# Patient Record
Sex: FEMALE | Race: WHITE | Employment: OTHER | URBAN - METROPOLITAN AREA
[De-identification: names, ages, dates, MRNs, and addresses within clinical notes are randomized per-mention and may not be internally consistent; named-entity substitution may affect disease eponyms.]

---

## 2018-04-25 ENCOUNTER — HOSPITAL ENCOUNTER (OUTPATIENT)
Age: 53
Discharge: HOME | End: 2018-04-27
Payer: COMMERCIAL

## 2018-04-25 ENCOUNTER — HOSPITAL ENCOUNTER (OUTPATIENT)
Dept: GENERAL RADIOLOGY | Age: 53
Discharge: HOME | End: 2018-04-27
Payer: COMMERCIAL

## 2018-04-25 DIAGNOSIS — R52 PAIN: ICD-10-CM

## 2018-04-25 PROCEDURE — 73521 X-RAY EXAM HIPS BI 2 VIEWS: CPT

## 2018-04-25 PROCEDURE — 73552 X-RAY EXAM OF FEMUR 2/>: CPT

## 2018-04-25 PROCEDURE — 73630 X-RAY EXAM OF FOOT: CPT

## 2018-10-31 ENCOUNTER — TELEPHONE (OUTPATIENT)
Dept: ORTHOPEDIC SURGERY | Age: 53
End: 2018-10-31

## 2018-11-01 RX ORDER — IBUPROFEN 800 MG/1
800 TABLET ORAL EVERY 8 HOURS PRN
Qty: 90 TABLET | Refills: 3 | Status: SHIPPED | OUTPATIENT
Start: 2018-11-01 | End: 2019-09-05 | Stop reason: ALTCHOICE

## 2019-09-05 ENCOUNTER — HOSPITAL ENCOUNTER (EMERGENCY)
Age: 54
Discharge: HOME | End: 2019-09-05
Attending: FAMILY MEDICINE
Payer: COMMERCIAL

## 2019-09-05 VITALS
DIASTOLIC BLOOD PRESSURE: 82 MMHG | HEIGHT: 62 IN | WEIGHT: 200 LBS | TEMPERATURE: 97.9 F | RESPIRATION RATE: 14 BRPM | OXYGEN SATURATION: 97 % | SYSTOLIC BLOOD PRESSURE: 140 MMHG | HEART RATE: 88 BPM | BODY MASS INDEX: 36.8 KG/M2

## 2019-09-05 DIAGNOSIS — M54.50 ACUTE EXACERBATION OF CHRONIC LOW BACK PAIN: Primary | ICD-10-CM

## 2019-09-05 DIAGNOSIS — G89.29 ACUTE EXACERBATION OF CHRONIC LOW BACK PAIN: Primary | ICD-10-CM

## 2019-09-05 PROCEDURE — 6370000000 HC RX 637 (ALT 250 FOR IP): Performed by: FAMILY MEDICINE

## 2019-09-05 PROCEDURE — 96372 THER/PROPH/DIAG INJ SC/IM: CPT

## 2019-09-05 PROCEDURE — 99283 EMERGENCY DEPT VISIT LOW MDM: CPT

## 2019-09-05 PROCEDURE — 6360000002 HC RX W HCPCS: Performed by: FAMILY MEDICINE

## 2019-09-05 RX ORDER — ACETAMINOPHEN 500 MG
1000 TABLET ORAL ONCE
Status: COMPLETED | OUTPATIENT
Start: 2019-09-05 | End: 2019-09-05

## 2019-09-05 RX ORDER — CYCLOBENZAPRINE HCL 10 MG
10 TABLET ORAL ONCE
Status: COMPLETED | OUTPATIENT
Start: 2019-09-05 | End: 2019-09-05

## 2019-09-05 RX ORDER — KETOROLAC TROMETHAMINE 30 MG/ML
30 INJECTION, SOLUTION INTRAMUSCULAR; INTRAVENOUS ONCE
Status: COMPLETED | OUTPATIENT
Start: 2019-09-05 | End: 2019-09-05

## 2019-09-05 RX ORDER — ACETAMINOPHEN 500 MG
1000 TABLET ORAL EVERY 8 HOURS PRN
Qty: 50 TABLET | Refills: 0 | Status: SHIPPED | OUTPATIENT
Start: 2019-09-05

## 2019-09-05 RX ORDER — CYCLOBENZAPRINE HCL 5 MG
5 TABLET ORAL 3 TIMES DAILY PRN
Qty: 12 TABLET | Refills: 0 | Status: SHIPPED | OUTPATIENT
Start: 2019-09-05 | End: 2019-09-15

## 2019-09-05 RX ORDER — IBUPROFEN 400 MG/1
400 TABLET ORAL EVERY 8 HOURS PRN
Qty: 12 TABLET | Refills: 0 | Status: SHIPPED | OUTPATIENT
Start: 2019-09-05

## 2019-09-05 RX ADMIN — ACETAMINOPHEN 1000 MG: 500 TABLET ORAL at 10:29

## 2019-09-05 RX ADMIN — KETOROLAC TROMETHAMINE 30 MG: 30 INJECTION, SOLUTION INTRAMUSCULAR at 10:29

## 2019-09-05 RX ADMIN — CYCLOBENZAPRINE HYDROCHLORIDE 10 MG: 10 TABLET, FILM COATED ORAL at 10:29

## 2019-09-05 ASSESSMENT — PAIN DESCRIPTION - ORIENTATION
ORIENTATION: RIGHT
ORIENTATION: RIGHT
ORIENTATION: RIGHT;LOWER

## 2019-09-05 ASSESSMENT — PAIN SCALES - GENERAL
PAINLEVEL_OUTOF10: 10
PAINLEVEL_OUTOF10: 8
PAINLEVEL_OUTOF10: 10
PAINLEVEL_OUTOF10: 6

## 2019-09-05 ASSESSMENT — PAIN DESCRIPTION - ONSET: ONSET: GRADUAL

## 2019-09-05 ASSESSMENT — PAIN DESCRIPTION - PROGRESSION
CLINICAL_PROGRESSION: GRADUALLY IMPROVING
CLINICAL_PROGRESSION: GRADUALLY WORSENING
CLINICAL_PROGRESSION: GRADUALLY IMPROVING

## 2019-09-05 ASSESSMENT — PAIN DESCRIPTION - LOCATION
LOCATION: BACK

## 2019-09-05 ASSESSMENT — PAIN DESCRIPTION - PAIN TYPE: TYPE: ACUTE PAIN

## 2019-09-05 ASSESSMENT — PAIN DESCRIPTION - FREQUENCY: FREQUENCY: CONTINUOUS

## 2019-09-05 NOTE — ED NOTES
Pt states her pain is much less. Pt not driving, pt's daughter is here and will be driving.      Elijah Oh RN  09/05/19 [id number]

## 2019-09-05 NOTE — ED PROVIDER NOTES
[bacitracin-neomycin-polymyxin]    Physical Exam           ED Triage Vitals   BP Temp Temp Source Pulse Resp SpO2 Height Weight   09/05/19 0928 09/05/19 0928 09/05/19 0928 09/05/19 0928 09/05/19 0928 09/05/19 0928 09/05/19 0950 09/05/19 0950   (!) 140/82 97.9 °F (36.6 °C) Oral 86 16 97 % 5' 2\" (1.575 m) 200 lb (90.7 kg)      Oxygen Saturation Interpretation: Normal.    Constitutional:  Alert, development consistent with age. HEENT:  NC/NT. Airway patent. Neck:  Normal ROM. Supple. Respiratory:  Clear to auscultation and breath sounds equal.  CV:  Regular rate and rhythm, normal heart sounds, without pathological murmurs, ectopy, gallops, or rubs. GI:  Abdomen Soft, nontender, good bowel sounds. No firm or pulsatile mass. Back: lower lumbar spine right sided. Tenderness: Mild. Swelling: no.              Range of Motion: diminished range with pain. CVA Tenderness: No.            Straight leg raising:  Right positive at 30 degrees. Skin:  no erythema, rash or swelling noted. Distal Function:              Motor deficit: none. Sensory deficit: none. Pulse deficit: none. Calf Tenderness:  No Bilateral.               Edema:  none Both lower extremity(s). Reflexes: Bilateral knee,ankle,biceps normal.  Gait:  normal.  Integument:  Normal turgor. Warm, dry, without visible rash. Lymphatics: No lymphangitis or adenopathy noted. Neurological:  Oriented. Motor functions intact. Lab / Imaging Results   (All laboratory and radiology results have been personally reviewed by myself)  Labs:  No results found for this visit on 09/05/19. Imaging: All Radiology results interpreted by Radiologist unless otherwise noted.   No orders to display       ED Course / Medical Decision Making     Medications   ketorolac (TORADOL) injection 30 mg (30 mg Intramuscular Given 9/5/19 1029)   cyclobenzaprine (FLEXERIL) tablet 10 mg (10 mg Oral Given 9/5/19 1029)   acetaminophen (TYLENOL) tablet 1,000 mg (1,000 mg Oral Given 9/5/19 1029)        Re-examination:  9/5/19       Time: 12:10    Patients symptoms are improving. Consult(s):   None    Procedure(s):   none    Medical Decision Making:    Films were not obtained based on negative suspicion for bony injury as per history/physical findings . Alem Flores At this time the patient is without objective evidence of an acute process requiring inpatient management. Counseling: The emergency provider has spoken with the patient and discussed todays results, in addition to providing specific details for the plan of care and counseling regarding the diagnosis and prognosis. Questions are answered at this time and they are agreeable with the plan. Assessment      1. Acute exacerbation of chronic low back pain      Plan   Discharge to home  Patient condition is good    New Medications     New Prescriptions    ACETAMINOPHEN (TYLENOL) 500 MG TABLET    Take 2 tablets by mouth every 8 hours as needed for Pain    CYCLOBENZAPRINE (FLEXERIL) 5 MG TABLET    Take 1 tablet by mouth 3 times daily as needed for Muscle spasms Medication may cause drowsiness do not drive on this medication    IBUPROFEN (IBU) 400 MG TABLET    Take 1 tablet by mouth every 8 hours as needed for Pain Take with full glass of water     Electronically signed by Isaias Cook MD   DD: 9/5/19  **This report was transcribed using voice recognition software. Every effort was made to ensure accuracy; however, inadvertent computerized transcription errors may be present.   END OF ED PROVIDER NOTE        Isaias Cook MD  09/05/19 [id number]       Isaias Cook MD  09/05/19 4368

## 2019-10-11 ENCOUNTER — HOSPITAL ENCOUNTER (OUTPATIENT)
Dept: MAMMOGRAPHY | Age: 54
Discharge: HOME | End: 2019-10-13
Payer: COMMERCIAL

## 2019-10-11 DIAGNOSIS — Z12.39 SCREENING FOR BREAST CANCER: ICD-10-CM

## 2019-10-11 PROCEDURE — 77063 BREAST TOMOSYNTHESIS BI: CPT

## 2022-05-16 ENCOUNTER — APPOINTMENT (OUTPATIENT)
Dept: GENERAL RADIOLOGY | Age: 57
End: 2022-05-16
Payer: COMMERCIAL

## 2022-05-16 ENCOUNTER — HOSPITAL ENCOUNTER (EMERGENCY)
Age: 57
Discharge: HOME | End: 2022-05-16
Attending: EMERGENCY MEDICINE
Payer: COMMERCIAL

## 2022-05-16 VITALS
BODY MASS INDEX: 36.8 KG/M2 | DIASTOLIC BLOOD PRESSURE: 80 MMHG | HEIGHT: 62 IN | HEART RATE: 82 BPM | WEIGHT: 200 LBS | OXYGEN SATURATION: 96 % | TEMPERATURE: 98.4 F | RESPIRATION RATE: 18 BRPM | SYSTOLIC BLOOD PRESSURE: 180 MMHG

## 2022-05-16 DIAGNOSIS — S40.021A CONTUSION OF RIGHT UPPER EXTREMITY, INITIAL ENCOUNTER: Primary | ICD-10-CM

## 2022-05-16 PROCEDURE — 73030 X-RAY EXAM OF SHOULDER: CPT

## 2022-05-16 PROCEDURE — 73110 X-RAY EXAM OF WRIST: CPT

## 2022-05-16 PROCEDURE — 73130 X-RAY EXAM OF HAND: CPT

## 2022-05-16 PROCEDURE — 73060 X-RAY EXAM OF HUMERUS: CPT

## 2022-05-16 PROCEDURE — 6370000000 HC RX 637 (ALT 250 FOR IP): Performed by: EMERGENCY MEDICINE

## 2022-05-16 PROCEDURE — 73090 X-RAY EXAM OF FOREARM: CPT

## 2022-05-16 PROCEDURE — 73070 X-RAY EXAM OF ELBOW: CPT

## 2022-05-16 PROCEDURE — 99283 EMERGENCY DEPT VISIT LOW MDM: CPT

## 2022-05-16 RX ORDER — HYDROCODONE BITARTRATE AND ACETAMINOPHEN 5; 325 MG/1; MG/1
1 TABLET ORAL ONCE
Status: COMPLETED | OUTPATIENT
Start: 2022-05-16 | End: 2022-05-16

## 2022-05-16 RX ADMIN — HYDROCODONE BITARTRATE AND ACETAMINOPHEN 1 TABLET: 5; 325 TABLET ORAL at 01:07

## 2022-05-16 ASSESSMENT — PAIN SCALES - GENERAL
PAINLEVEL_OUTOF10: 5
PAINLEVEL_OUTOF10: 7

## 2022-05-16 ASSESSMENT — ENCOUNTER SYMPTOMS
VOMITING: 0
ABDOMINAL DISTENTION: 0
DIARRHEA: 0
SINUS PRESSURE: 0
SORE THROAT: 0
EYE DISCHARGE: 0
COUGH: 0
WHEEZING: 0
SHORTNESS OF BREATH: 0
EYE PAIN: 0
EYE REDNESS: 0
NAUSEA: 0
BACK PAIN: 0

## 2022-05-16 NOTE — ED PROVIDER NOTES
useful if symptoms persist.         XR HUMERUS RIGHT (MIN 2 VIEWS)   Final Result   Chronic osseous findings. No acute bony abnormality. Short-term follow-up   recommended if symptoms persist.         XR ELBOW RIGHT (2 VIEWS)   Final Result   Soft tissue edema. Chronic osseous findings. XR RADIUS ULNA RIGHT (2 VIEWS)   Final Result   Soft tissue edema. No acute osseous abnormality. XR WRIST RIGHT (MIN 3 VIEWS)   Final Result   Chronic appearing findings. Short-term follow-up recommended if symptoms   persist.         XR HAND RIGHT (MIN 3 VIEWS)   Final Result   Prominent degenerative changes. ------------------------- NURSING NOTES AND VITALS REVIEWED ---------------------------  Date / Time Roomed:  5/16/2022 12:51 AM  ED Bed Assignment:  19/19    The nursing notes within the ED encounter and vital signs as below have been reviewed. BP (!) 180/80   Pulse 82   Temp 98.4 °F (36.9 °C) (Infrared)   Resp 18   Ht 5' 2\" (1.575 m)   Wt 200 lb (90.7 kg)   LMP 12/09/2014 (Approximate)   SpO2 96%   BMI 36.58 kg/m²   Oxygen Saturation Interpretation: Normal      ------------------------------------------ PROGRESS NOTES ------------------------------------------  I have spoken with the patient and discussed todays results, in addition to providing specific details for the plan of care and counseling regarding the diagnosis and prognosis. Their questions are answered at this time and they are agreeable with the plan. I discussed at length with them reasons for immediate return here for re evaluation. They will followup with primary care by calling their office tomorrow. --------------------------------- ADDITIONAL PROVIDER NOTES ---------------------------------  At this time the patient is without objective evidence of an acute process requiring hospitalization or inpatient management.   They have remained hemodynamically stable throughout their entire ED visit and are stable for discharge with outpatient follow-up. The plan has been discussed in detail and they are aware of the specific conditions for emergent return, as well as the importance of follow-up. New Prescriptions    No medications on file       Diagnosis:  1. Contusion of right upper extremity, initial encounter        Disposition:  Patient's disposition: Discharge to home  Patient's condition is stable.          [facility],   05/16/22 0217

## 2022-05-16 NOTE — ED NOTES
Pt. C/o R. Arm and shoulder pain, pt. states they fell on Saturday and hit their arm on a concrete stool.      Micaela Martin RN  05/16/22 [id number]

## 2022-12-09 ENCOUNTER — HOSPITAL ENCOUNTER (EMERGENCY)
Age: 57
Discharge: HOME | End: 2022-12-09
Attending: FAMILY MEDICINE
Payer: COMMERCIAL

## 2022-12-09 VITALS
TEMPERATURE: 99.1 F | DIASTOLIC BLOOD PRESSURE: 85 MMHG | SYSTOLIC BLOOD PRESSURE: 150 MMHG | RESPIRATION RATE: 16 BRPM | OXYGEN SATURATION: 98 % | WEIGHT: 215 LBS | HEIGHT: 62 IN | BODY MASS INDEX: 39.56 KG/M2 | HEART RATE: 88 BPM

## 2022-12-09 DIAGNOSIS — J11.1 INFLUENZA: Primary | ICD-10-CM

## 2022-12-09 PROCEDURE — 99283 EMERGENCY DEPT VISIT LOW MDM: CPT

## 2022-12-09 RX ORDER — BENZONATATE 200 MG/1
200 CAPSULE ORAL 3 TIMES DAILY PRN
Qty: 30 CAPSULE | Refills: 0 | Status: SHIPPED | OUTPATIENT
Start: 2022-12-09 | End: 2022-12-09 | Stop reason: SDUPTHER

## 2022-12-09 RX ORDER — OSELTAMIVIR PHOSPHATE 75 MG/1
75 CAPSULE ORAL 2 TIMES DAILY
Qty: 10 CAPSULE | Refills: 0 | Status: SHIPPED | OUTPATIENT
Start: 2022-12-09 | End: 2022-12-10 | Stop reason: SDUPTHER

## 2022-12-09 RX ORDER — ECHINACEA PURPUREA EXTRACT 125 MG
1 TABLET ORAL PRN
Qty: 88 ML | Refills: 0 | Status: SHIPPED | OUTPATIENT
Start: 2022-12-09 | End: 2022-12-10 | Stop reason: SDUPTHER

## 2022-12-09 RX ORDER — GUAIFENESIN AND DEXTROMETHORPHAN HYDROBROMIDE 1200; 60 MG/1; MG/1
1 TABLET, EXTENDED RELEASE ORAL 2 TIMES DAILY
Qty: 28 TABLET | Refills: 0 | Status: SHIPPED | OUTPATIENT
Start: 2022-12-09 | End: 2022-12-10 | Stop reason: SDUPTHER

## 2022-12-09 RX ORDER — BENZONATATE 200 MG/1
200 CAPSULE ORAL 3 TIMES DAILY PRN
Qty: 30 CAPSULE | Refills: 0 | Status: SHIPPED | OUTPATIENT
Start: 2022-12-09 | End: 2022-12-10 | Stop reason: SDUPTHER

## 2022-12-09 ASSESSMENT — PAIN DESCRIPTION - DESCRIPTORS: DESCRIPTORS: ACHING

## 2022-12-09 ASSESSMENT — PAIN DESCRIPTION - ORIENTATION: ORIENTATION: RIGHT

## 2022-12-09 ASSESSMENT — PAIN DESCRIPTION - PAIN TYPE: TYPE: ACUTE PAIN

## 2022-12-09 ASSESSMENT — PAIN DESCRIPTION - ONSET: ONSET: ON-GOING

## 2022-12-09 ASSESSMENT — PAIN - FUNCTIONAL ASSESSMENT: PAIN_FUNCTIONAL_ASSESSMENT: 0-10

## 2022-12-09 ASSESSMENT — PAIN DESCRIPTION - FREQUENCY: FREQUENCY: CONTINUOUS

## 2022-12-09 ASSESSMENT — PAIN SCALES - GENERAL: PAINLEVEL_OUTOF10: 6

## 2022-12-10 RX ORDER — OSELTAMIVIR PHOSPHATE 75 MG/1
75 CAPSULE ORAL 2 TIMES DAILY
Qty: 10 CAPSULE | Refills: 0 | Status: SHIPPED | OUTPATIENT
Start: 2022-12-10 | End: 2022-12-14

## 2022-12-10 RX ORDER — BENZONATATE 200 MG/1
200 CAPSULE ORAL 3 TIMES DAILY PRN
Qty: 30 CAPSULE | Refills: 0 | Status: SHIPPED | OUTPATIENT
Start: 2022-12-10 | End: 2022-12-14

## 2022-12-10 RX ORDER — GUAIFENESIN AND DEXTROMETHORPHAN HYDROBROMIDE 1200; 60 MG/1; MG/1
1 TABLET, EXTENDED RELEASE ORAL 2 TIMES DAILY
Qty: 28 TABLET | Refills: 0 | Status: SHIPPED | OUTPATIENT
Start: 2022-12-10

## 2022-12-10 RX ORDER — ECHINACEA PURPUREA EXTRACT 125 MG
1 TABLET ORAL PRN
Qty: 88 ML | Refills: 0 | Status: SHIPPED | OUTPATIENT
Start: 2022-12-10

## 2022-12-10 NOTE — ED PROVIDER NOTES
HPI:  22,   Time: 7:31 PM AMIE Rascon is a 59 y.o. female presenting to the ED for cute onset upon arising this morning of nasal congestion and nonproductive cough, associated with some right ear pressure and cracking sensation. As she been exposed to influenza A, work granddaughter's had tested positive recently. She denies body aches or fever or chills or nausea or vomiting or diarrhea. ROS:   Pertinent positives and negatives are stated within HPI, all other systems reviewed and are negative.  --------------------------------------------- PAST HISTORY ---------------------------------------------  Past Medical History:  has a past medical history of Asthma and Hypertension. Past Surgical History:  has a past surgical history that includes  section; Tonsillectomy; sinus surgery; Appendectomy; Carpal tunnel release; and Shoulder arthroscopy (Right, 14). Social History:  reports that she has quit smoking. She has never used smokeless tobacco. She reports that she does not drink alcohol and does not use drugs. Family History: family history is not on file. The patients home medications have been reviewed. Allergies: Neosporin [bacitracin-neomycin-polymyxin]    -------------------------------------------------- RESULTS -------------------------------------------------  All laboratory and radiology results have been personally reviewed by myself   LABS:  No results found for this visit on 22. RADIOLOGY:  Interpreted by Radiologist.  No orders to display       ------------------------- NURSING NOTES AND VITALS REVIEWED ---------------------------   The nursing notes within the ED encounter and vital signs as below have been reviewed.    BP (!) 150/85   Pulse 88   Temp 99.1 °F (37.3 °C) (Temporal)   Resp 16   Ht 5' 2\" (1.575 m)   Wt 215 lb (97.5 kg)   LMP 2014 (Approximate)   SpO2 98%   BMI 39.32 kg/m²   Oxygen Saturation Interpretation: Normal      ---------------------------------------------------PHYSICAL EXAM--------------------------------------    Constitutional/General: Alert and oriented x3, well appearing, non toxic in NAD  Head: NC/AT  Eyes: PERRL, EOMI  Ears: Both tympanic membranes are intact, without erythema and abnormal light reflex. Mouth: Oropharynx clear, handling secretions, no trismus  Neck: Supple, full ROM, no meningeal signs  Pulmonary: Lungs clear to auscultation bilaterally, no wheezes, rales, or rhonchi. Not in respiratory distress  Cardiovascular:  Regular rate and rhythm, no murmurs, gallops, or rubs. 2+ distal pulses  Abdomen: Soft, non tender, non distended,   Extremities: Moves all extremities x 4. Warm and well perfused  Skin: warm and dry without rash  Neurologic: GCS 15,  Psych: Normal Affect      ------------------------------ ED COURSE/MEDICAL DECISION MAKING----------------------  Medications - No data to display      Medical Decision Making:    Simple    Counseling: The emergency provider has spoken with the patient and discussed todays results, in addition to providing specific details for the plan of care and counseling regarding the diagnosis and prognosis. Questions are answered at this time and they are agreeable with the plan.      --------------------------------- IMPRESSION AND DISPOSITION ---------------------------------    IMPRESSION  1.  Influenza        DISPOSITION  Disposition: Discharge to home  Patient condition is stable                 Leeanne Layne MD  12/09/22 1932

## 2022-12-14 ENCOUNTER — HOSPITAL ENCOUNTER (EMERGENCY)
Age: 57
Discharge: HOME | End: 2022-12-14
Attending: EMERGENCY MEDICINE
Payer: COMMERCIAL

## 2022-12-14 VITALS
BODY MASS INDEX: 37.91 KG/M2 | HEART RATE: 70 BPM | HEIGHT: 62 IN | RESPIRATION RATE: 20 BRPM | DIASTOLIC BLOOD PRESSURE: 78 MMHG | WEIGHT: 206 LBS | TEMPERATURE: 97.5 F | SYSTOLIC BLOOD PRESSURE: 147 MMHG | OXYGEN SATURATION: 98 %

## 2022-12-14 DIAGNOSIS — J20.9 ACUTE BRONCHITIS, UNSPECIFIED ORGANISM: Primary | ICD-10-CM

## 2022-12-14 PROCEDURE — 99283 EMERGENCY DEPT VISIT LOW MDM: CPT

## 2022-12-14 RX ORDER — DOXYCYCLINE HYCLATE 100 MG
100 TABLET ORAL 2 TIMES DAILY
Qty: 20 TABLET | Refills: 0 | Status: SHIPPED | OUTPATIENT
Start: 2022-12-14 | End: 2022-12-24

## 2022-12-14 RX ORDER — BROMPHENIRAMINE MALEATE, PSEUDOEPHEDRINE HYDROCHLORIDE, AND DEXTROMETHORPHAN HYDROBROMIDE 2; 30; 10 MG/5ML; MG/5ML; MG/5ML
5 SYRUP ORAL 4 TIMES DAILY PRN
Qty: 120 ML | Refills: 0 | Status: SHIPPED | OUTPATIENT
Start: 2022-12-14

## 2022-12-14 ASSESSMENT — ENCOUNTER SYMPTOMS
VOMITING: 0
ABDOMINAL PAIN: 0
SHORTNESS OF BREATH: 0
DIARRHEA: 0
SORE THROAT: 0
COUGH: 1
BACK PAIN: 0
BLOOD IN STOOL: 0
EYE DISCHARGE: 0
EYE PAIN: 0
RHINORRHEA: 1
WHEEZING: 1
ABDOMINAL DISTENTION: 0
EYE REDNESS: 0
NAUSEA: 0
SINUS PRESSURE: 0

## 2022-12-14 ASSESSMENT — PAIN SCALES - GENERAL: PAINLEVEL_OUTOF10: 5

## 2022-12-14 ASSESSMENT — PAIN DESCRIPTION - PAIN TYPE: TYPE: ACUTE PAIN

## 2022-12-14 ASSESSMENT — PAIN DESCRIPTION - FREQUENCY: FREQUENCY: CONTINUOUS

## 2022-12-14 ASSESSMENT — PAIN - FUNCTIONAL ASSESSMENT: PAIN_FUNCTIONAL_ASSESSMENT: 0-10

## 2022-12-14 ASSESSMENT — PAIN DESCRIPTION - DESCRIPTORS: DESCRIPTORS: ACHING

## 2022-12-14 NOTE — ED PROVIDER NOTES
SEEN LAST WEEK/ NOT FEELING BETTER/ DIAGNOSED WITH INFLUENZA    The history is provided by the patient. URI  Presenting symptoms: congestion, cough, facial pain, fatigue and rhinorrhea    Presenting symptoms: no ear pain, no fever and no sore throat    Severity:  Moderate  Onset quality:  Gradual  Duration:  1 week  Chronicity:  Recurrent  Associated symptoms: wheezing    Associated symptoms: no arthralgias and no headaches       Review of Systems   Constitutional:  Positive for fatigue. Negative for chills and fever. HENT:  Positive for congestion and rhinorrhea. Negative for ear pain, sinus pressure and sore throat. Eyes:  Negative for pain, discharge and redness. Respiratory:  Positive for cough and wheezing. Negative for shortness of breath. Cardiovascular:  Negative for chest pain. Gastrointestinal:  Negative for abdominal distention, abdominal pain, blood in stool, diarrhea, nausea and vomiting. Genitourinary:  Negative for dysuria and frequency. Musculoskeletal:  Negative for arthralgias and back pain. Skin:  Negative for rash and wound. Neurological:  Negative for weakness and headaches. Hematological:  Negative for adenopathy. All other systems reviewed and are negative. Physical Exam  Vitals and nursing note reviewed. Constitutional:       Appearance: She is well-developed. HENT:      Head: Normocephalic and atraumatic. Right Ear: Hearing and external ear normal. Tympanic membrane is retracted. Left Ear: Hearing and external ear normal. Tympanic membrane is retracted. Nose: Mucosal edema and congestion present. Mouth/Throat:      Pharynx: Uvula midline. Eyes:      General: Lids are normal.      Conjunctiva/sclera: Conjunctivae normal.      Pupils: Pupils are equal, round, and reactive to light. Cardiovascular:      Rate and Rhythm: Normal rate and regular rhythm. Heart sounds: Normal heart sounds. No murmur heard.   Pulmonary:      Effort: Pulmonary effort is normal. No respiratory distress. Breath sounds: Rhonchi present. No wheezing or rales. Abdominal:      General: Bowel sounds are normal.      Palpations: Abdomen is soft. Abdomen is not rigid. Tenderness: There is no abdominal tenderness. There is no guarding or rebound. Musculoskeletal:      Cervical back: Normal range of motion and neck supple. Skin:     General: Skin is warm and dry. Findings: No abrasion or rash. Neurological:      Mental Status: She is alert and oriented to person, place, and time. GCS: GCS eye subscore is 4. GCS verbal subscore is 5. GCS motor subscore is 6. Cranial Nerves: No cranial nerve deficit. Sensory: No sensory deficit. Coordination: Coordination normal.      Gait: Gait normal.        Procedures     MDM          --------------------------------------------- PAST HISTORY ---------------------------------------------  Past Medical History:  has a past medical history of Asthma and Hypertension. Past Surgical History:  has a past surgical history that includes  section; Tonsillectomy; sinus surgery; Appendectomy; Carpal tunnel release; and Shoulder arthroscopy (Right, 14). Social History:  reports that she has quit smoking. She has never used smokeless tobacco. She reports that she does not drink alcohol and does not use drugs. Family History: family history is not on file. The patients home medications have been reviewed. Allergies: Neosporin [bacitracin-neomycin-polymyxin]    -------------------------------------------------- RESULTS -------------------------------------------------  Labs:  No results found for this visit on 22.     Radiology:  No orders to display       ------------------------- NURSING NOTES AND VITALS REVIEWED ---------------------------  Date / Time Roomed:  2022 11:09 AM  ED Bed Assignment:      The nursing notes within the ED encounter and vital signs as below have been reviewed. BP (!) 147/78   Pulse 70   Temp 97.5 °F (36.4 °C) (Temporal)   Resp 20   Ht 5' 2\" (1.575 m)   Wt 206 lb (93.4 kg)   LMP 12/09/2014 (Approximate)   SpO2 98%   BMI 37.68 kg/m²   Oxygen Saturation Interpretation: Normal      ------------------------------------------ PROGRESS NOTES ------------------------------------------  I have spoken with the patient and discussed todays results, in addition to providing specific details for the plan of care and counseling regarding the diagnosis and prognosis. Their questions are answered at this time and they are agreeable with the plan. I discussed at length with them reasons for immediate return here for re evaluation. They will followup with primary care by calling their office tomorrow. Medications - No data to display      --------------------------------- ADDITIONAL PROVIDER NOTES ---------------------------------  At this time the patient is without objective evidence of an acute process requiring hospitalization or inpatient management. They have remained hemodynamically stable throughout their entire ED visit and are stable for discharge with outpatient follow-up. The plan has been discussed in detail and they are aware of the specific conditions for emergent return, as well as the importance of follow-up. New Prescriptions    BROMPHENIRAMINE-PSEUDOEPHEDRINE-DM 2-30-10 MG/5ML SYRUP    Take 5 mLs by mouth 4 times daily as needed for Congestion or Cough    DOXYCYCLINE HYCLATE (VIBRA-TABS) 100 MG TABLET    Take 1 tablet by mouth 2 times daily for 10 days       Diagnosis:  1. Acute bronchitis, unspecified organism        Disposition:  Patient's disposition: Discharge to home  Patient's condition is stable.                     Norah García MD  12/14/22 [id number]

## 2023-10-03 ENCOUNTER — TREATMENT (OUTPATIENT)
Dept: PHYSICAL THERAPY | Facility: HOSPITAL | Age: 58
End: 2023-10-03
Payer: COMMERCIAL

## 2023-10-03 ENCOUNTER — DOCUMENTATION (OUTPATIENT)
Dept: PHYSICAL THERAPY | Facility: HOSPITAL | Age: 58
End: 2023-10-03
Payer: COMMERCIAL

## 2023-10-03 DIAGNOSIS — M54.12 RADICULOPATHY, CERVICAL REGION: ICD-10-CM

## 2023-10-03 DIAGNOSIS — R29.898 OTHER SYMPTOMS AND SIGNS INVOLVING THE MUSCULOSKELETAL SYSTEM: ICD-10-CM

## 2023-10-03 DIAGNOSIS — M54.12 CERVICAL RADICULOPATHY: Primary | ICD-10-CM

## 2023-10-03 DIAGNOSIS — R29.898 DECREASED RANGE OF MOTION OF NECK: ICD-10-CM

## 2023-10-03 DIAGNOSIS — M54.2 NECK PAIN: ICD-10-CM

## 2023-10-03 DIAGNOSIS — M54.2 CERVICALGIA: ICD-10-CM

## 2023-10-03 PROBLEM — M54.9 CHRONIC BACK PAIN: Status: ACTIVE | Noted: 2023-10-03

## 2023-10-03 PROBLEM — J45.909 ASTHMA (HHS-HCC): Status: ACTIVE | Noted: 2023-10-03

## 2023-10-03 PROBLEM — I10 HYPERTENSION: Status: ACTIVE | Noted: 2023-10-03

## 2023-10-03 PROBLEM — G47.30 SLEEP APNEA: Status: ACTIVE | Noted: 2023-10-03

## 2023-10-03 PROBLEM — G89.29 CHRONIC BACK PAIN: Status: ACTIVE | Noted: 2023-10-03

## 2023-10-03 PROCEDURE — 97110 THERAPEUTIC EXERCISES: CPT | Mod: GP | Performed by: PHYSICAL THERAPIST

## 2023-10-03 PROCEDURE — 97035 APP MDLTY 1+ULTRASOUND EA 15: CPT | Mod: GP | Performed by: PHYSICAL THERAPIST

## 2023-10-03 RX ORDER — ALBUTEROL SULFATE 90 UG/1
AEROSOL, METERED RESPIRATORY (INHALATION)
COMMUNITY

## 2023-10-03 RX ORDER — AMLODIPINE BESYLATE 10 MG/1
TABLET ORAL
COMMUNITY
Start: 2023-09-27

## 2023-10-03 RX ORDER — METFORMIN HYDROCHLORIDE 500 MG/1
TABLET ORAL
COMMUNITY
Start: 2023-09-27

## 2023-10-03 RX ORDER — VALSARTAN AND HYDROCHLOROTHIAZIDE 160; 25 MG/1; MG/1
1 TABLET ORAL DAILY
COMMUNITY
Start: 2023-09-27

## 2023-10-03 RX ORDER — SODIUM CHLORIDE 0.65 %
1 AEROSOL, SPRAY (ML) NASAL AS NEEDED
COMMUNITY
Start: 2022-12-10

## 2023-10-03 ASSESSMENT — PATIENT HEALTH QUESTIONNAIRE - PHQ9
SUM OF ALL RESPONSES TO PHQ9 QUESTIONS 1 AND 2: 0
2. FEELING DOWN, DEPRESSED OR HOPELESS: NOT AT ALL
1. LITTLE INTEREST OR PLEASURE IN DOING THINGS: NOT AT ALL

## 2023-10-03 ASSESSMENT — PAIN - FUNCTIONAL ASSESSMENT: PAIN_FUNCTIONAL_ASSESSMENT: 0-10

## 2023-10-03 ASSESSMENT — ENCOUNTER SYMPTOMS
LOSS OF SENSATION IN FEET: 0
OCCASIONAL FEELINGS OF UNSTEADINESS: 0
DEPRESSION: 0

## 2023-10-03 ASSESSMENT — PAIN SCALES - GENERAL: PAINLEVEL_OUTOF10: 7

## 2023-10-03 NOTE — PROGRESS NOTES
Physical Therapy    Physical Therapy Treatment    Patient Name: Blossom Ann  MRN: [id number]  Today's Date: 10/3/2023         Assessment:  heat and US to Upper trap and posterior neck with some relief  tolerated ex with no increase in pain  ROM limited in Rt UE and neck, educate to stay within pain free range       Plan:Planned interventions include: aquatic therapy, cryotherapy, education/instruction, electrical stimulation, home program, hot pack, manual therapy, mechanical traction, neuromuscular re-education, therapeutic activities, therapeutic exercises and ultrasound.   Frequency and duration: 2 time(s) a week, for 4 weeks.     continue with modality and build home ex .   Progress with POC, as tolerated.        Current Problem  1. Cervical radiculopathy        2. Neck pain        3. Decreased range of motion of neck            Subjective  Neck radiculopathy Rt arm to fingers     Precautions  No falls, no ortho surgery to neck, hx of degeneration C2-C7     Vital Signs /73  pulse 82.  Pt takes meds for BP, she has high family stress today     Pain  Pain Assessment: 0-10  Pain Score: 7  Pain Type: Referred pain  Neck and Rt arm referred , 7-8/10 ( at first visit eval was 9-10/10 )  Pt has high stress re a family situation    Objective      NDI 48 at 9-7-23    Posture  Forwards shoulders, protracted head, educated in posture        Treatments:  EXERCISES Date 10-3-23 Date Date Date    REPS REPS REPS REPS   Pulleys flex 3 min      Pulleys scaption 3 min       Supine wand ex shoulder       Elbow flex / extend       Supinate  10 x       Scapular retraction 10 x 1 5 sec      Cervical retraction 10 x 1  5 sec       Levator stretch 10 sec x 3      Upper trap stretch  10 sec x 3      Finger opposition 5 x1      ER with supinate  10 x 2      Tband row Red 10 x 2      Tband lat pull Red 10 x 2                    heat 10 min cervical      ultrasound 1.3 W/cm2 8 min UT      aquatics yes                                                                                    Visit 6      HEP               Goals:  Encounter Problems       Encounter Problems (Active)       PT Problem       PT Goal        Start:  10/03/23    Expected End:  12/03/23       Short term goals ( 2 week)  1 Pt will demonstrate independence and compliance with HEP and self management  for improving ROM of neck and UE   2 pt to demo increased awareness of posture and body mechanics to reduce neck pain  3 SHoulders to WFL for motion for increased function    Long term goals ( 4 week)  1 Pt will demonstrate >=50% reduction of pain  2 Pt will improve strength by >=1/3 MMT to improve ease with functional mobility  3 Posture demo good carry over with IADLs and ex  4 NDI to improve 5 points or more for increased function and ease with IADLs

## 2023-10-03 NOTE — PROGRESS NOTES
Physical Therapy                 Therapy Communication Note    Patient Name: Blossom Ann  MRN: [id number]  Today's Date: 10/3/2023     Discipline: Physical Therapy    Missed Visit Reason:  family emergency    Missed Time: Cancel    Comment: land only

## 2023-10-05 ENCOUNTER — TREATMENT (OUTPATIENT)
Dept: PHYSICAL THERAPY | Facility: HOSPITAL | Age: 58
End: 2023-10-05
Payer: COMMERCIAL

## 2023-10-05 DIAGNOSIS — M54.12 RADICULOPATHY, CERVICAL REGION: ICD-10-CM

## 2023-10-05 DIAGNOSIS — M54.2 NECK PAIN: Primary | ICD-10-CM

## 2023-10-05 DIAGNOSIS — M54.2 NECK PAIN: ICD-10-CM

## 2023-10-05 DIAGNOSIS — R29.898 DECREASED RANGE OF MOTION OF NECK: ICD-10-CM

## 2023-10-05 DIAGNOSIS — R29.898 OTHER SYMPTOMS AND SIGNS INVOLVING THE MUSCULOSKELETAL SYSTEM: ICD-10-CM

## 2023-10-05 DIAGNOSIS — M54.12 CERVICAL RADICULOPATHY: Primary | ICD-10-CM

## 2023-10-05 DIAGNOSIS — M54.12 CERVICAL RADICULOPATHY: ICD-10-CM

## 2023-10-05 DIAGNOSIS — M54.2 CERVICALGIA: ICD-10-CM

## 2023-10-05 PROCEDURE — 97113 AQUATIC THERAPY/EXERCISES: CPT | Mod: GP,CQ

## 2023-10-05 PROCEDURE — 97140 MANUAL THERAPY 1/> REGIONS: CPT | Mod: GP | Performed by: PHYSICAL THERAPIST

## 2023-10-05 PROCEDURE — 97110 THERAPEUTIC EXERCISES: CPT | Mod: 59,GP | Performed by: PHYSICAL THERAPIST

## 2023-10-05 ASSESSMENT — ENCOUNTER SYMPTOMS
LOSS OF SENSATION IN FEET: 0
DEPRESSION: 0
OCCASIONAL FEELINGS OF UNSTEADINESS: 0

## 2023-10-05 ASSESSMENT — PAIN SCALES - GENERAL
PAINLEVEL_OUTOF10: 6
PAINLEVEL_OUTOF10: 6

## 2023-10-05 ASSESSMENT — PAIN DESCRIPTION - DESCRIPTORS: DESCRIPTORS: BURNING

## 2023-10-05 ASSESSMENT — PAIN - FUNCTIONAL ASSESSMENT
PAIN_FUNCTIONAL_ASSESSMENT: 0-10
PAIN_FUNCTIONAL_ASSESSMENT: 0-10

## 2023-10-05 NOTE — PROGRESS NOTES
"Physical Therapy    Physical Therapy Treatment    Patient Name: Blossom Ann  MRN: [id number]  Today's Date: 10/5/2023  Time Calculation  Start Time: 1043  Stop Time: 1125  Time Calculation (min): 42 min      Assessment:  PT Assessment  Evaluation/Treatment Tolerance: Patient tolerated treatment well  Assessment Comment: improved postural awareness with treatment pain 5/10 after all exs    Plan:       Current Problem  1. Neck pain        2. Radiculopathy, cervical region  Follow Up In Physical Therapy      3. Cervicalgia  Follow Up In Physical Therapy      4. Other symptoms and signs involving the musculoskeletal system  Follow Up In Physical Therapy      5. Cervical radiculopathy            Subjective pt reports feeling a little better after land therapy today            Pain  Pain Assessment: 0-10  Pain Score: 6  Pain Type: Referred pain  Pain Location: Neck  Pain Orientation: Right  Pain Radiating Towards: R Arm/hand  Pain Descriptors: Burning  Pain Frequency: Constant/continuous    Objective    Added paddle to wand exs for increased resistance           Treatments:   Aquatic Exercise  Aquatic Exercise Performed: Yes  Aquatic Exercise Activity 1: Amb Forw/Retro/Lat X 2 laps EA  Aquatic Exercise Activity 2: Marching 2 laps  Aquatic Exercise Activity 3: TR/HR X 20  Aquatic Exercise Activity 4: Squats X 20  Aquatic Exercise Activity 5: SLR X 3 - Lat/Flex/Ext-20 ea  Aquatic Exercise Activity 6: Shoulder rolls 20 EA  Aquatic Exercise Activity 7: Scap Squeeze 3\" H x 20  Aquatic Exercise Activity 8: Wand exs- shoulder Flex/ext, ABD/ADD,Horizontal ABD/ADD X 15 EA yellow paddles  Aquatic Exercise Activity 9: deep water distraction x 5'        Goals:  Encounter Problems       Encounter Problems (Active)       PT Problem       PT Goal 1       Start:  10/05/23    Expected End:  12/05/23       In 4 weeks, BLOSSOM ANN will achieve the following goals:     Short term goals ( 2 week)  1 Pt will demonstrate independence and " compliance with HEP and self management  for improving ROM of neck and UE   2 pt to demo increased awareness of posture and body mechanics to reduce neck pain  3 SHoulders to WFL for motion for increased function    Long term goals ( 4 week)  1 Pt will demonstrate >=50% reduction of pain  2 Pt will improve strength by >=1/3 MMT to improve ease with functional mobility  3 Posture demo good carry over with IADLs and ex  4 NDI to improve 5 points or more for increased function and ease with IADLs
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
## 2023-10-17 ENCOUNTER — TREATMENT (OUTPATIENT)
Dept: PHYSICAL THERAPY | Facility: HOSPITAL | Age: 58
End: 2023-10-17
Payer: COMMERCIAL

## 2023-10-17 DIAGNOSIS — M54.2 NECK PAIN: ICD-10-CM

## 2023-10-17 DIAGNOSIS — R29.898 DECREASED RANGE OF MOTION OF NECK: ICD-10-CM

## 2023-10-17 DIAGNOSIS — M54.12 RADICULOPATHY, CERVICAL REGION: ICD-10-CM

## 2023-10-17 DIAGNOSIS — M54.12 RADICULOPATHY, CERVICAL REGION: Primary | ICD-10-CM

## 2023-10-17 PROCEDURE — 97110 THERAPEUTIC EXERCISES: CPT | Mod: GP,CQ,59

## 2023-10-17 PROCEDURE — 97113 AQUATIC THERAPY/EXERCISES: CPT | Mod: GP,CQ

## 2023-10-17 PROCEDURE — 97012 MECHANICAL TRACTION THERAPY: CPT | Mod: GP,CQ,59

## 2023-10-17 ASSESSMENT — PAIN SCALES - GENERAL
PAINLEVEL_OUTOF10: 6
PAINLEVEL_OUTOF10: 7

## 2023-10-17 ASSESSMENT — PAIN DESCRIPTION - DESCRIPTORS
DESCRIPTORS: BURNING
DESCRIPTORS: ACHING;BURNING;NUMBNESS

## 2023-10-17 ASSESSMENT — PAIN - FUNCTIONAL ASSESSMENT
PAIN_FUNCTIONAL_ASSESSMENT: 0-10
PAIN_FUNCTIONAL_ASSESSMENT: 0-10

## 2023-10-17 NOTE — PROGRESS NOTES
"Physical Therapy    Physical Therapy Treatment    Patient Name: Blossom Ann  MRN: [id number]  Today's Date: 10/17/2023  Time Calculation  Start Time: 0951  Stop Time: 1031  Time Calculation (min): 40 min  Visit #8      Current Problem  1. Radiculopathy, cervical region  Follow Up In Physical Therapy      2. Neck pain        3. Decreased range of motion of neck            Subjective:  Subjective   Patient reported pain remains the same. She would like to try mechanical traction today.       Precautions  Precautions  Precautions Comment: none       Pain  Pain Assessment: 0-10  Pain Score: 6  Pain Type: Referred pain  Pain Location: Neck  Pain Orientation: Right  Pain Radiating Towards: R arm/hand  Pain Descriptors: Burning  Pain Frequency: Constant/continuous    Objective    Added mechanical traction      Treatments:  EXERCISES Date 10/17/23 Date Date Date    REPS REPS REPS REPS   Pulleys flex 3'      Pulleys scaption 3'             Tband: rows 2x10 red                 Lat pull downs 2x10 red             Upper traps stretch 3x15\" ea.      Levator stretch 3x15\" ea.             Cervical retraction 10 x 5\" H                                  Mechanical traction 30/10 12#/8#  15'                                                                                                               HEP            Assessment:   Pt. Had increased pain with upper traps stretch. Pt. Felt tightness in neck with first few minutes of mechanical retraction but felt a release. She had improved pain 5/10 at end of session with numbness remaining in fingers    Plan:   Assess patient's response to mechanical traction.    Goals:  Active       PT Problem       PT Goal 1       Start:  10/05/23    Expected End:  12/05/23       In 4 weeks, BLOSSOM ANN will achieve the following goals:     Short term goals ( 2 week)  1 Pt will demonstrate independence and compliance with HEP and self management  for improving ROM of neck and UE   2 pt to demo " increased awareness of posture and body mechanics to reduce neck pain  3 SHoulders to WFL for motion for increased function    Long term goals ( 4 week)  1 Pt will demonstrate >=50% reduction of pain  2 Pt will improve strength by >=1/3 MMT to improve ease with functional mobility  3 Posture demo good carry over with IADLs and ex  4 NDI to improve 5 points or more for increased function and ease with IADLs

## 2023-10-17 NOTE — PROGRESS NOTES
"Physical Therapy    Physical Therapy Treatment    Patient Name: Blossom Ann  MRN: [id number]  Today's Date: 10/17/2023  Time Calculation  Start Time: 1045  Stop Time: 1130  Time Calculation (min): 45 min  VISIT 8      Assessment:  PT Assessment  Evaluation/Treatment Tolerance: Patient tolerated treatment well  Assessment Comment: improved pace with exs today, good hermann of new exs, pain decrerased 5-6/10 after all treatment    Plan:  OP PT Plan  PT Plan:  (progress as able for decreased pain)    Current Problem  1. Radiculopathy, cervical region  Follow Up In Physical Therapy          Subjective  pt reports pain still in R Neck and sometimes all the way down to R Hand       Precautions  Precautions  Precautions Comment: none     Pain  Pain Assessment: 0-10  Pain Score: 7  Pain Type: Acute pain  Pain Location: Neck  Pain Orientation: Right  Pain Radiating Towards: R shoulder-R Hand  Pain Descriptors: Aching, Burning, Numbness  Pain Frequency: Constant/continuous    Objective  added tray exs         Treatments:     Aquatic Exercise  Aquatic Exercise Performed: Yes  Aquatic Exercise Activity 1: Amb Forw/Retro/Lat X 2 laps EA  Aquatic Exercise Activity 2: Marching 2 laps  Aquatic Exercise Activity 3: TR/HR X 20  Aquatic Exercise Activity 4: Squats X 20  Aquatic Exercise Activity 5: SLR X 3 - Lat/Flex/Ext-20 ea  Aquatic Exercise Activity 6: Shoulder rolls 20 EA  Aquatic Exercise Activity 7: Scap Squeeze 3\" H x 20  Aquatic Exercise Activity 8: Wand exs- shoulder Flex/ext, ABD/ADD,Horizontal ABD/ADD X 15 EA yellow paddles  Aquatic Exercise Activity 9: deep water distraction x 5'  Aquatic Exercise Activity 10: Tray exs PUSH/PULL, UP/DOWN X 15 EA - T1      Goals:  Active       PT Problem       PT Goal 1       Start:  10/05/23    Expected End:  12/05/23       In 4 weeks, BLOSSOM ANN will achieve the following goals:     Short term goals ( 2 week)  1 Pt will demonstrate independence and compliance with HEP and self " management  for improving ROM of neck and UE   2 pt to demo increased awareness of posture and body mechanics to reduce neck pain  3 SHoulders to WFL for motion for increased function    Long term goals ( 4 week)  1 Pt will demonstrate >=50% reduction of pain  2 Pt will improve strength by >=1/3 MMT to improve ease with functional mobility  3 Posture demo good carry over with IADLs and ex  4 NDI to improve 5 points or more for increased function and ease with IADLs

## 2023-10-19 ENCOUNTER — DOCUMENTATION (OUTPATIENT)
Dept: PHYSICAL THERAPY | Facility: HOSPITAL | Age: 58
End: 2023-10-19
Payer: COMMERCIAL

## 2023-10-19 ENCOUNTER — APPOINTMENT (OUTPATIENT)
Dept: PHYSICAL THERAPY | Facility: HOSPITAL | Age: 58
End: 2023-10-19
Payer: COMMERCIAL

## 2023-10-19 NOTE — PROGRESS NOTES
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

## 2023-10-30 ENCOUNTER — TREATMENT (OUTPATIENT)
Dept: PHYSICAL THERAPY | Facility: HOSPITAL | Age: 58
End: 2023-10-30
Payer: COMMERCIAL

## 2023-10-30 DIAGNOSIS — R29.898 DECREASED RANGE OF MOTION OF NECK: ICD-10-CM

## 2023-10-30 DIAGNOSIS — M54.12 CERVICAL RADICULOPATHY: ICD-10-CM

## 2023-10-30 DIAGNOSIS — M54.2 NECK PAIN: ICD-10-CM

## 2023-10-30 DIAGNOSIS — M54.12 CERVICAL RADICULOPATHY: Primary | ICD-10-CM

## 2023-10-30 PROCEDURE — 97113 AQUATIC THERAPY/EXERCISES: CPT | Mod: GP,CQ

## 2023-10-30 PROCEDURE — 97035 APP MDLTY 1+ULTRASOUND EA 15: CPT | Mod: GP,CQ,59 | Performed by: PHYSICAL THERAPY ASSISTANT

## 2023-10-30 PROCEDURE — 97110 THERAPEUTIC EXERCISES: CPT | Mod: GP,CQ,59 | Performed by: PHYSICAL THERAPY ASSISTANT

## 2023-10-30 ASSESSMENT — PAIN - FUNCTIONAL ASSESSMENT
PAIN_FUNCTIONAL_ASSESSMENT: 0-10
PAIN_FUNCTIONAL_ASSESSMENT: 0-10

## 2023-10-30 ASSESSMENT — PAIN DESCRIPTION - DESCRIPTORS
DESCRIPTORS: ACHING;DULL
DESCRIPTORS: ACHING;DULL

## 2023-10-30 ASSESSMENT — PAIN SCALES - GENERAL
PAINLEVEL_OUTOF10: 6
PAINLEVEL_OUTOF10: 6

## 2023-10-30 NOTE — PROGRESS NOTES
"Physical Therapy    Physical Therapy Treatment    Patient Name: Blossom Ann  MRN: [id number]  Today's Date: 10/30/2023  Time Calculation  Start Time: 1020  Stop Time: 1105  Time Calculation (min): 45 min  VISIT 9      Assessment:  PT Assessment  Evaluation/Treatment Tolerance: Patient tolerated treatment well  Assessment Comment: improved motion with exs in water, pain after treatment 6/10 in R UE, pt reports that Shoulder is a little more sore now    Plan:  OP PT Plan  PT Plan:  (progress as hermann for decrerased pain)    Current Problem  1. Cervical radiculopathy  Follow Up In Physical Therapy      2. Neck pain  Follow Up In Physical Therapy      3. Decreased range of motion of neck  Follow Up In Physical Therapy          Subjective     Pt reports doing pretty good today compare to saturday    Precautions        Pain  Pain Assessment: 0-10  Pain Score: 6  Pain Type: Acute pain  Pain Location: Neck  Pain Orientation: Right  Pain Radiating Towards: R shoulder-R Hand  Pain Descriptors: Aching, Dull  Pain Frequency: Constant/continuous    Objective         Treatments:     Aquatic Exercise  Aquatic Exercise Performed: Yes  Aquatic Exercise Activity 1: Amb Forw/Retro/Lat X 2 laps EA  Aquatic Exercise Activity 2: Marching 2 laps  Aquatic Exercise Activity 3: TR/HR X 20  Aquatic Exercise Activity 4: Squats X 20  Aquatic Exercise Activity 5: SLR X 3 - Lat/Flex/Ext-20 ea  Aquatic Exercise Activity 6: Shoulder rolls 20 EA  Aquatic Exercise Activity 7: Scap Squeeze 3\" H x 20  Aquatic Exercise Activity 8: Wand exs- shoulder Flex/ext, ABD/ADD,Horizontal ABD/ADD X 15 EA yellow paddles  Aquatic Exercise Activity 9: deep water distraction x 5'  Aquatic Exercise Activity 10: Tray exs PUSH/PULL, UP/DOWN X 15 EA - T1      Goals:  Active       PT Problem       PT Goal 1       Start:  10/05/23    Expected End:  12/05/23       In 4 weeks, BLOSSOM ANN will achieve the following goals:     Short term goals ( 2 week)  1 Pt will " demonstrate independence and compliance with HEP and self management  for improving ROM of neck and UE   2 pt to demo increased awareness of posture and body mechanics to reduce neck pain  3 SHoulders to WFL for motion for increased function    Long term goals ( 4 week)  1 Pt will demonstrate >=50% reduction of pain  2 Pt will improve strength by >=1/3 MMT to improve ease with functional mobility  3 Posture demo good carry over with IADLs and ex  4 NDI to improve 5 points or more for increased function and ease with IADLs

## 2023-10-30 NOTE — PROGRESS NOTES
"Physical Therapy    Physical Therapy Treatment    Patient Name: Blossom Ann  MRN: [id number]  Today's Date: 10/30/2023  Time Calculation  Start Time: 1120  Stop Time: 1205  Time Calculation (min): 45 min  Visit #9      Current Problem  1. Cervical radiculopathy  Follow Up In Physical Therapy      2. Neck pain  Follow Up In Physical Therapy      3. Decreased range of motion of neck  Follow Up In Physical Therapy          Subjective   Patient reported pain remains the same. Se does feel therapy is helping. Pt reports she feels ultrasound helps more than mechanical traction.        Precautions  Precautions  Precautions Comment:  (No falls, no ortho surgery to neck, hx of degeneration C2-C7)    Pain  Pain Assessment: 0-10  Pain Score: 6  Pain Type: Acute pain  Pain Location: Neck  Pain Orientation: Right  Pain Radiating Towards:  (Right shoulder to fingertips)  Pain Descriptors: Aching, Dull  Pain Frequency: Constant/continuous    Objective   Resumed Ultrasound.      Treatments:  EXERCISES Date 10/17/23 Date 10/30/23 Date Date    REPS REPS REPS REPS   Pulleys flex 3' 3'     Pulleys scaption 3' 3\"            Tband: rows 2x10 red 2 x 10 Red                Lat pull downs 2x10 red 2 x 10 Red            Upper traps stretch 3x15\" ea. 3x 20 sec     Levator stretch 3x15\" ea. 3x 20 sec            Cervical retraction 10 x 5\" H 2 x 10 5sec      Scap retract   2 x 10 5 sec                          Mechanical traction 30/10 12#/8#  15'                                                                                          Ultrasound  50%, 1.3 w x 8 min                   HEP            Assessment:  Pt requires consistent cues for proper technique. Reviewed HEP. Reports reduced pain post session.    Plan:   Continue with ultrasound trial. Progress UE strengthening, posture ex and body mechanics.    Goals:  Active       PT Problem       PT Goal 1       Start:  10/05/23    Expected End:  12/05/23       In 4 weeks, BLOSSOM ANN will " achieve the following goals:     Short term goals ( 2 week)  1 Pt will demonstrate independence and compliance with HEP and self management  for improving ROM of neck and UE   2 pt to demo increased awareness of posture and body mechanics to reduce neck pain  3 SHoulders to WFL for motion for increased function    Long term goals ( 4 week)  1 Pt will demonstrate >=50% reduction of pain  2 Pt will improve strength by >=1/3 MMT to improve ease with functional mobility  3 Posture demo good carry over with IADLs and ex  4 NDI to improve 5 points or more for increased function and ease with IADLs

## 2023-10-31 ENCOUNTER — APPOINTMENT (OUTPATIENT)
Dept: PHYSICAL THERAPY | Facility: HOSPITAL | Age: 58
End: 2023-10-31
Payer: COMMERCIAL

## 2023-11-01 ENCOUNTER — TREATMENT (OUTPATIENT)
Dept: PHYSICAL THERAPY | Facility: HOSPITAL | Age: 58
End: 2023-11-01
Payer: COMMERCIAL

## 2023-11-01 DIAGNOSIS — M54.2 NECK PAIN: ICD-10-CM

## 2023-11-01 DIAGNOSIS — M54.12 CERVICAL RADICULOPATHY: ICD-10-CM

## 2023-11-01 DIAGNOSIS — R29.898 DECREASED RANGE OF MOTION OF NECK: ICD-10-CM

## 2023-11-01 PROCEDURE — 97035 APP MDLTY 1+ULTRASOUND EA 15: CPT | Mod: GP,CQ,59 | Performed by: PHYSICAL THERAPY ASSISTANT

## 2023-11-01 PROCEDURE — 97113 AQUATIC THERAPY/EXERCISES: CPT | Mod: GP,CQ

## 2023-11-01 PROCEDURE — 97110 THERAPEUTIC EXERCISES: CPT | Mod: GP,CQ,59 | Performed by: PHYSICAL THERAPY ASSISTANT

## 2023-11-01 ASSESSMENT — PAIN SCALES - GENERAL
PAINLEVEL_OUTOF10: 6
PAINLEVEL_OUTOF10: 7

## 2023-11-01 ASSESSMENT — PAIN DESCRIPTION - DESCRIPTORS
DESCRIPTORS: ACHING;TINGLING
DESCRIPTORS: ACHING;TINGLING

## 2023-11-01 ASSESSMENT — PAIN - FUNCTIONAL ASSESSMENT
PAIN_FUNCTIONAL_ASSESSMENT: 0-10
PAIN_FUNCTIONAL_ASSESSMENT: 0-10

## 2023-11-01 NOTE — PROGRESS NOTES
"Physical Therapy    Physical Therapy Treatment    Patient Name: Blossom Ann  MRN: [id number]  Today's Date: 11/1/2023  Time Calculation  Start Time: 1115  Stop Time: 1200  Time Calculation (min): 45 min  Visit #10      Current Problem  1. Cervical radiculopathy  Follow Up In Physical Therapy      2. Neck pain  Follow Up In Physical Therapy      3. Decreased range of motion of neck  Follow Up In Physical Therapy          Subjective   Patient reports relief following last session, attributes to ultrasound. Reports continue R hand tingling with activity. Reports HEP stretches are helping.      Precautions  Precautions  Precautions Comment: no falls    Pain  Pain Assessment: 0-10  Pain Score: 6  Pain Type: Acute pain  Pain Location: Neck  Pain Orientation: Right  Pain Radiating Towards: R hand  Pain Descriptors: Aching, Tingling  Pain Frequency: Constant/continuous    Objective   Add scap retract with ER      Treatments:  EXERCISES Date 10/17/23 Date 10/30/23 Date 11/1/23 Date    REPS REPS REPS REPS   Pulleys flex 3' 3' 3'    Pulleys scaption 3' 3\" 3'           Tband: rows 2x10 red 2 x 10 Red 2 x 10 Red               Lat pull downs 2x10 red 2 x 10 Red 2 x 10 Red           Upper traps stretch 3x15\" ea. 3x 20 sec 3 x 20 sec    Levator stretch 3x15\" ea. 3x 20 sec 3 x 20 sec           Cervical retraction 10 x 5\" H 2 x 10 5 sec  2 x 10 5 sec     Scap retract   2 x 10 5 sec 2 x 10 5 sec    No Monies   2 x 10 3 sec                  Mechanical traction 30/10 12#/8#  15'                                                                                          Ultrasound  50%, 1.3 w x 8 min 50%, 1.3 w x 8 min                  HEP            Assessment:  Reviewed HEP with minimal cues for technique and to hold stretches longer. Reports reduced pain post session to 3/10    Plan:   Continue with ultrasound. Progress UE strengthening, posture ex and body mechanics.    Goals:  Active       PT Problem       PT Goal 1       Start:  " 10/05/23    Expected End:  12/05/23       In 4 weeks, PATT ROSE will achieve the following goals:     Short term goals ( 2 week)  1 Pt will demonstrate independence and compliance with HEP and self management  for improving ROM of neck and UE   2 pt to demo increased awareness of posture and body mechanics to reduce neck pain  3 SHoulders to WFL for motion for increased function    Long term goals ( 4 week)  1 Pt will demonstrate >=50% reduction of pain  2 Pt will improve strength by >=1/3 MMT to improve ease with functional mobility  3 Posture demo good carry over with IADLs and ex  4 NDI to improve 5 points or more for increased function and ease with IADLs

## 2023-11-01 NOTE — PROGRESS NOTES
"Physical Therapy    Physical Therapy Treatment    Patient Name: Blossom Ann  MRN: [id number]  Today's Date: 11/1/2023  Time Calculation  Start Time: 1010  Stop Time: 1055  Time Calculation (min): 45 min  VISIT 10      Assessment:  PT Assessment  Evaluation/Treatment Tolerance: Patient tolerated treatment well  Assessment Comment: pain 5-6/10 today with treatment, good hermann of increased resistance, pt increased pace with amb    Plan:  OP PT Plan  PT Plan:  (progress for decrease pain)    Current Problem  1. Cervical radiculopathy  Follow Up In Physical Therapy      2. Neck pain  Follow Up In Physical Therapy      3. Decreased range of motion of neck  Follow Up In Physical Therapy          Subjective  pt reports increased tightness and ache today with cold weather       Precautions  Precautions  Precautions Comment: no falls     Pain  Pain Assessment: 0-10  Pain Score: 7  Pain Type: Acute pain  Pain Location: Neck  Pain Orientation: Right  Pain Radiating Towards: R Hand  Pain Descriptors: Aching, Tingling  Pain Frequency: Constant/continuous    Objective increased laps amb and arm swing added BDB to wand exs        Treatments:     Aquatic Exercise  Aquatic Exercise Performed: Yes  Aquatic Exercise Activity 1: Amb Forw/Retro/Lat X 2 laps EA  Aquatic Exercise Activity 2: Marching 2 laps  Aquatic Exercise Activity 3: TR/HR X 20  Aquatic Exercise Activity 4: Squats X 20  Aquatic Exercise Activity 5: SLR X 3 - Lat/Flex/Ext-20 ea  Aquatic Exercise Activity 6: Shoulder rolls 20 EA  Aquatic Exercise Activity 7: Scap Squeeze 3\" H x 20  Aquatic Exercise Activity 8: Wand exs- shoulder Flex/ext, ABD/ADD,Horizontal ABD/ADD X 15 EA BDB  Aquatic Exercise Activity 9: deep water distraction x 5'  Aquatic Exercise Activity 10: Tray exs PUSH/PULL, UP/DOWN X 15 EA - T1      Goals:  Active       PT Problem       PT Goal 1       Start:  10/05/23    Expected End:  12/05/23       In 4 weeks, BLOSSOM ANN will achieve the following goals: "     Short term goals ( 2 week)  1 Pt will demonstrate independence and compliance with HEP and self management  for improving ROM of neck and UE   2 pt to demo increased awareness of posture and body mechanics to reduce neck pain  3 SHoulders to WFL for motion for increased function    Long term goals ( 4 week)  1 Pt will demonstrate >=50% reduction of pain  2 Pt will improve strength by >=1/3 MMT to improve ease with functional mobility  3 Posture demo good carry over with IADLs and ex  4 NDI to improve 5 points or more for increased function and ease with IADLs

## 2023-11-02 ENCOUNTER — APPOINTMENT (OUTPATIENT)
Dept: PHYSICAL THERAPY | Facility: HOSPITAL | Age: 58
End: 2023-11-02
Payer: COMMERCIAL

## 2023-11-06 ENCOUNTER — APPOINTMENT (OUTPATIENT)
Dept: PHYSICAL THERAPY | Facility: HOSPITAL | Age: 58
End: 2023-11-06
Payer: COMMERCIAL

## 2023-11-06 ENCOUNTER — DOCUMENTATION (OUTPATIENT)
Dept: PHYSICAL THERAPY | Facility: HOSPITAL | Age: 58
End: 2023-11-06
Payer: COMMERCIAL

## 2023-11-06 ENCOUNTER — TREATMENT (OUTPATIENT)
Dept: PHYSICAL THERAPY | Facility: HOSPITAL | Age: 58
End: 2023-11-06
Payer: COMMERCIAL

## 2023-11-06 DIAGNOSIS — M54.12 CERVICAL RADICULOPATHY: ICD-10-CM

## 2023-11-06 DIAGNOSIS — M54.2 NECK PAIN: ICD-10-CM

## 2023-11-06 DIAGNOSIS — R29.898 DECREASED RANGE OF MOTION OF NECK: ICD-10-CM

## 2023-11-06 PROCEDURE — 97110 THERAPEUTIC EXERCISES: CPT | Mod: GP,CQ

## 2023-11-06 PROCEDURE — 97035 APP MDLTY 1+ULTRASOUND EA 15: CPT | Mod: GP,CQ

## 2023-11-06 ASSESSMENT — PAIN - FUNCTIONAL ASSESSMENT: PAIN_FUNCTIONAL_ASSESSMENT: 0-10

## 2023-11-06 ASSESSMENT — PAIN SCALES - GENERAL: PAINLEVEL_OUTOF10: 6

## 2023-11-06 ASSESSMENT — PAIN DESCRIPTION - DESCRIPTORS: DESCRIPTORS: TINGLING;NUMBNESS

## 2023-11-06 NOTE — PROGRESS NOTES
Physical Therapy                 Therapy Communication Note    Patient Name: Blossom Ann  MRN: [id number]  Today's Date: 11/6/2023     Discipline: Physical Therapy    Missed Visit Reason:      Missed Time: Cancel    Comment:

## 2023-11-06 NOTE — PROGRESS NOTES
"Physical Therapy    Physical Therapy Treatment    Patient Name: Blossom Ann  MRN: [id number]  Today's Date: 11/6/2023  Time Calculation  Start Time: 0910  Stop Time: 0954  Time Calculation (min): 44 min      Assessment:   Pt tolerated all exercises well with minimal difficulty reporting decreased pain after U/S    Plan:   Continue with ulnar nerve stretching and flossing to decrease N/T    Current Problem  1. Cervical radiculopathy  Follow Up In Physical Therapy      2. Neck pain  Follow Up In Physical Therapy      3. Decreased range of motion of neck  Follow Up In Physical Therapy          Subjective   General   Pt reports constant numbness in R pinky finger and intermittent numbness in R ring finger.   Precautions  Precautions  Precautions Comment: no falls  Pain  Pain Assessment: 0-10  Pain Score: 6  Pain Orientation: Right  Pain Radiating Towards: elbow  Pain Descriptors: Tingling, Numbness    Objective   Treatments:     EXERCISES Date 10/17/23 Date 10/30/23 Date 11/1/23 Date 11/6/23    REPS REPS REPS REPS   Pulleys flex 3' 3' 3' 3 min    Pulleys scaption 3' 3\" 3' 3 min   Pulleys IR    2sec hold x10   Tband: rows 2x10 red 2 x 10 Red 2 x 10 Red 2x10 red               Lat pull downs 2x10 red 2 x 10 Red 2 x 10 Red 2x10 red           Upper traps stretch 3x15\" ea. 3x 20 sec 3 x 20 sec    Levator stretch 3x15\" ea. 3x 20 sec 3 x 20 sec           Cervical retraction 10 x 5\" H 2 x 10 5 sec  2 x 10 5 sec     Scap retract   2 x 10 5 sec 2 x 10 5 sec    No Monies   2 x 10 3 sec                  Mechanical traction 30/10 12#/8#  15'                                                                                          Ultrasound  50%, 1.3 w x 8 min 50%, 1.3 w x 8 min 100% 1.1w/cm2 8min with biofreeze                 HEP         Goals:  Active       PT Problem       PT Goal 1       Start:  10/05/23    Expected End:  12/05/23       In 4 weeks, BLOSSOM ANN will achieve the following goals:     Short term goals ( 2 " week)  1 Pt will demonstrate independence and compliance with HEP and self management  for improving ROM of neck and UE   2 pt to demo increased awareness of posture and body mechanics to reduce neck pain  3 SHoulders to WFL for motion for increased function    Long term goals ( 4 week)  1 Pt will demonstrate >=50% reduction of pain  2 Pt will improve strength by >=1/3 MMT to improve ease with functional mobility  3 Posture demo good carry over with IADLs and ex  4 NDI to improve 5 points or more for increased function and ease with IADLs

## 2023-11-21 ENCOUNTER — TREATMENT (OUTPATIENT)
Dept: PHYSICAL THERAPY | Facility: HOSPITAL | Age: 58
End: 2023-11-21
Payer: COMMERCIAL

## 2023-11-21 DIAGNOSIS — M54.12 CERVICAL RADICULOPATHY: ICD-10-CM

## 2023-11-21 DIAGNOSIS — M54.2 NECK PAIN: ICD-10-CM

## 2023-11-21 DIAGNOSIS — R29.898 DECREASED RANGE OF MOTION OF NECK: ICD-10-CM

## 2023-11-21 PROCEDURE — 97110 THERAPEUTIC EXERCISES: CPT | Mod: GP | Performed by: PHYSICAL THERAPIST

## 2023-11-21 ASSESSMENT — PAIN SCALES - GENERAL: PAINLEVEL_OUTOF10: 4

## 2023-11-21 ASSESSMENT — PAIN - FUNCTIONAL ASSESSMENT: PAIN_FUNCTIONAL_ASSESSMENT: 0-10
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
## 2023-12-26 ENCOUNTER — APPOINTMENT (OUTPATIENT)
Dept: RADIOLOGY | Facility: HOSPITAL | Age: 58
End: 2023-12-26
Payer: COMMERCIAL

## 2023-12-26 ENCOUNTER — HOSPITAL ENCOUNTER (EMERGENCY)
Facility: HOSPITAL | Age: 58
Discharge: HOME | End: 2023-12-27
Attending: EMERGENCY MEDICINE
Payer: COMMERCIAL

## 2023-12-26 VITALS
HEIGHT: 62 IN | RESPIRATION RATE: 16 BRPM | HEART RATE: 81 BPM | DIASTOLIC BLOOD PRESSURE: 82 MMHG | WEIGHT: 200 LBS | TEMPERATURE: 98 F | SYSTOLIC BLOOD PRESSURE: 166 MMHG | BODY MASS INDEX: 36.8 KG/M2 | OXYGEN SATURATION: 95 %

## 2023-12-26 DIAGNOSIS — M25.561 ACUTE PAIN OF RIGHT KNEE: Primary | ICD-10-CM

## 2023-12-26 DIAGNOSIS — M79.671 RIGHT FOOT PAIN: ICD-10-CM

## 2023-12-26 PROCEDURE — 99284 EMERGENCY DEPT VISIT MOD MDM: CPT | Performed by: EMERGENCY MEDICINE

## 2023-12-26 PROCEDURE — 73630 X-RAY EXAM OF FOOT: CPT | Mod: RT

## 2023-12-26 PROCEDURE — 73630 X-RAY EXAM OF FOOT: CPT | Mod: RIGHT SIDE | Performed by: RADIOLOGY

## 2023-12-26 PROCEDURE — 93971 EXTREMITY STUDY: CPT | Mod: FR

## 2023-12-26 PROCEDURE — 73564 X-RAY EXAM KNEE 4 OR MORE: CPT | Mod: RIGHT SIDE | Performed by: RADIOLOGY

## 2023-12-26 PROCEDURE — 73590 X-RAY EXAM OF LOWER LEG: CPT | Mod: RT

## 2023-12-26 PROCEDURE — 73564 X-RAY EXAM KNEE 4 OR MORE: CPT | Mod: RT

## 2023-12-26 PROCEDURE — 93971 EXTREMITY STUDY: CPT | Mod: FOREIGN READ | Performed by: RADIOLOGY

## 2023-12-26 PROCEDURE — 2500000001 HC RX 250 WO HCPCS SELF ADMINISTERED DRUGS (ALT 637 FOR MEDICARE OP): Performed by: EMERGENCY MEDICINE

## 2023-12-26 PROCEDURE — 73590 X-RAY EXAM OF LOWER LEG: CPT | Mod: RIGHT SIDE | Performed by: RADIOLOGY

## 2023-12-26 RX ADMIN — IBUPROFEN 600 MG: 200 TABLET, FILM COATED ORAL at 22:52

## 2023-12-26 ASSESSMENT — COLUMBIA-SUICIDE SEVERITY RATING SCALE - C-SSRS
2. HAVE YOU ACTUALLY HAD ANY THOUGHTS OF KILLING YOURSELF?: NO
6. HAVE YOU EVER DONE ANYTHING, STARTED TO DO ANYTHING, OR PREPARED TO DO ANYTHING TO END YOUR LIFE?: NO
1. IN THE PAST MONTH, HAVE YOU WISHED YOU WERE DEAD OR WISHED YOU COULD GO TO SLEEP AND NOT WAKE UP?: NO

## 2023-12-27 NOTE — ED PROVIDER NOTES
HPI   Chief Complaint   Patient presents with    Foot Injury     Right,     Knee Pain     Pt granddaughter jumped off bunk bed and landed on foot       HPI     HISTORY OF PRESENT ILLNESS:  Patient is a 58-year-old who presents to the emergency department for right foot and knee pain.  On Christmas eve, her granddaughter had jumped up that her and landed on her foot.  Patient had foot pain and was having difficulty with bearing weight on the foot.  A day later, she began to have posterior right knee pain.  Patient has been taking Tylenol ibuprofen without any change.  No chest pain, fever, chills.  No prior history of pulmonary embolism.     Past Medical History: Hypertension, hyperlipidemia    __________________________________________________________  PHYSICAL EXAM:    Appearance:  female, appears stated age, pleasant, alert, oriented , cooperative   Skin: Intact,  dry skin, no lesions, rash, petechiae or purpura.   Eyes: PERRLA, EOMs intact,  Conjunctiva pink with no redness or exudates.    HENT: Normocephalic, atraumatic. Nares patent   Neck: Supple. Trachea at midline.   Pulmonary: Lung sounds are clear bilaterally.  There is no rales, rhonchi, or wheezing.  Cardiac: Regular rate and rhythm, no rubs, murmurs, or gallops. No JVD,   Abdomen: Abdomen is soft, nontender, and nondistended.  No palpable organomegaly.  No rebound or guarding.  No CVA tenderness. Nonsurgical abdomen  Genitourinary: Exam deferred.  Musculoskeletal: Focused exam of right knee shows the flexor extensor are intact.  Pain with lateral aspect of the knee in addition to posterior calf in the proximal region.  Bruising present in first toe.  No edema, pain, cyanosis, or deformity in extremities. Pulses full and equal.   Neurological:  Cranial nerves are grossly intact, grossly normal sensation, no weakness, no focal findings identified.    __________________________________________________________  MEDICAL DECISION MAKING:    Patient  was seen and examined. Differential diagnosis for Right calf pain includes fracture, dislocation, musculoskeletal strain, development of a DVT secondary to immobilization.  Patient had a granddaughter that jumped off onto her foot.  There is bruising on the medial first toe.  However, patient began to develop calf pain after.  Knee exam is stable.  Because of the above differential, will obtain ultrasound and x-ray.  Patient was provided ibuprofen.  Patient x-rays were independently interpreted by myself and appeared negative for any obvious fracture or dislocation.  Radiology read agreed.  Ultrasound was negative.  Patient was reevaluated.  She was advised supportive care instructions including RICE.  She was placed in a postop shoe in addition to provided crutches.  She was given orthopedic follow-up.  Patient verbalized understanding, agreed to plan, the patient was discharged home.    Alexy Aguilera  Emergency Medicine               [place] Coma Scale Score: 15                  Patient History   Past Medical History:   Diagnosis Date    Personal history of other specified conditions     History of vertigo     Past Surgical History:   Procedure Laterality Date    OTHER SURGICAL HISTORY  2020     section    OTHER SURGICAL HISTORY  2020    Nasal septoplasty    OTHER SURGICAL HISTORY  2020    Tonsillectomy with adenoidectomy    OTHER SURGICAL HISTORY  2020    Shoulder surgery     Family History   Problem Relation Name Age of Onset    Breast cancer Mother      Diabetes Father      Prostate cancer Father      Hodgkin's lymphoma Sister      Esophageal cancer Sister      Lung cancer Other grandmother     Ovarian cancer Other grandmother      Social History     Tobacco Use    Smoking status: Not on file    Smokeless tobacco: Not on file   Substance Use Topics    Alcohol use: Not on file    Drug use: Not on file       Physical Exam   ED Triage Vitals [23 1936]   Temp Heart Rate Resp BP    36.7 °C (98 °F) 81 16 166/82      SpO2 Temp src Heart Rate Source Patient Position   95 % -- -- --      BP Location FiO2 (%)     -- --       Physical Exam    ED Course & MDM   Diagnoses as of 12/27/23 0059   Acute pain of right knee   Right foot pain       Medical Decision Making      Procedure  Procedures     Alexy Aguilera,   12/27/23 0059

## 2024-01-24 ENCOUNTER — NUTRITION (OUTPATIENT)
Dept: NUTRITION | Facility: CLINIC | Age: 59
End: 2024-01-24
Payer: COMMERCIAL

## 2024-01-24 VITALS — WEIGHT: 221 LBS | BODY MASS INDEX: 40.42 KG/M2

## 2024-01-24 DIAGNOSIS — E11.9 TYPE 2 DIABETES MELLITUS WITHOUT COMPLICATIONS (MULTI): ICD-10-CM

## 2024-01-24 DIAGNOSIS — E11.9 TYPE 2 DIABETES MELLITUS WITHOUT COMPLICATION, WITHOUT LONG-TERM CURRENT USE OF INSULIN (MULTI): Primary | ICD-10-CM

## 2024-01-24 DIAGNOSIS — Z71.3 DIETARY COUNSELING AND SURVEILLANCE: ICD-10-CM

## 2024-01-24 PROCEDURE — 97802 MEDICAL NUTRITION INDIV IN: CPT

## 2024-01-24 NOTE — PROGRESS NOTES
"NUTRITION ASSESSMENT NOTE    Reason for Nutrition Visit:  Pt is a 58 y.o. female being seen in person for an initial appointment at [facility] referred for T2DM.      Pt states they seek  from dietitian for help with managing blood sugar levels/diabetes, weight status, and overall health through nutrition.     Anthropometrics:  Wt: 221#, 100kg  Ht: 5'2\"  BMI: 40.42 kg/m2      Past Medical Hx:  Patient Active Problem List   Diagnosis    Hypertension    Chronic back pain    Sleep apnea    Asthma    Decreased range of motion of neck    Cervical radiculopathy    Neck pain          Lab Results   Component Value Date    CHOL 132 01/25/2021    LDLF 56 01/25/2021    TRIG 234 (H) 01/25/2021          Food and Nutrition Hx:  Pt does not have a POCT glucose or HbA1C on file, therefore the RDN cannot accurately gauge what the levels might be. Pt states her PCP informed her that they were high enough to dx diabetes and warrant the use of metformin.     DIETARY RECALL: *Pt consumes an average of 2-3 meals/day*  Wake-up: ~6:50am  Meal 1: Most days, ~9:50am-11am (if 11am, no lunch consumed), Poached eggs x 2 over toast (white) x 2 slices, pancakes x 1 w/ syrup, oatmeal (Hoahaoism, instant or old fashioned)  Meal 2: Some of the time (skips this meal if breakfast consumed), ~1pm, [place] w/ turkey meat/[place] on white bread, salad w/ tomatoes/green peppers/cucumbers/beets/dressing  Meal 3: Everyday, ~4pm, Spaghetti and meatballs (ground beef), ground beef, salad (tomatoes/cucumbers/peppers), chicken breast (baked, grilled), roast beef, mushrooms and steak, baked potatoes w/ butter, vegetables - broccoli, cauliflower, green beans, peas, carrots, corn, mashed potatoes w/ butter and milk  Snacks: Strawberries (in smoothie) w/ yogurt (Yoplait vanilla), apples, bananas, oranges, vegetables - cauliflower, broccoli, cherry tomatoes, peppers (red, orange, yellow), cucumbers all w/ dip (sour cream, ranch dip mix, miracle whip)  Bedtime: " Varied timing, ~12am/1am  Beverages: Water x 60-80oz daily, tea (hot) x 1 C, soda pop (rarely), 2% milk (occasionally)       NUTRITIONAL ARTIFACTS:  Loves fruits and vegetables -- does not like cheese    Allergies: None  Intolerance: None  Appetite: Good  Intake: >75%  GI Symptoms : None Frequency: absent  Swallowing Difficulty: Occasional choking if too big a bite taken - pt was told her mouth is too small  Dentition : own -- Fair condition, a lot of cavities and fillings    Supplements: Multivitamin, Vitamin C, and Zinc daily    Energy Levels: Low    Food Preparation: Patient  Cooking Skills/Barriers: None reported -- Pt likes to cook      Nutrition Focused Physical Exam:    Performed/Deferred: Deferred as pt visually appears well-nourished with no signs of malnutrition      Estimated Energy Needs:    WEIGHT MAINTENANCE: MSJ, 1436 x 1.2 (AF) = 1700 kcal/day  WEIGHT LOSS: 25-30 kcal/kg IBW = 8889-8518 kcal/day  PROTEIN Needs: 0.8-1 g/kg = 70-90 g/day    Nutrition Diagnosis:    Diagnosis Statement 1:  Diagnosis Status: New  Diagnosis : Inadequate protein intake  related to food and nutrition related knowledge deficit concerning appropriate amount and type of dietary fat and/or protein as evidenced by  daily estimated intake of protein at levels <75% daily recommended needs    Diagnosis Statement 2:  Diagnosis Status: New  Diagnosis : Inadequate fiber intake  related to food and nutrition related knowledge deficit concerning desirable quantities of fiber as evidenced by estimated intake of fiber that is insufficient when compared to recommended amounts (38 g/day for men and 25 g/day for women)    Diagnosis Statement 3:   Diagnosis Status: New  Diagnosis : Excessive carbohydrate intake related to lack of or limited prior nutrition-related education as evidenced by  regular and frequent intake of refined, processed carbohydrates that are high glycemic in nature and in levels and volumes exceeding daily total and each  meal recommendations      Nutrition Interventions:  Consistent Carbohydrate Diet, Decreased Carbohydrate Diet, Decreased Fat Diet, Increased Fiber Diet, Increased Soluble Fiber, Increased Omega-3 Diet, Increased Protein Diet, Low Saturated Fat Diet, Mediterranean Diet, and Plant Based Diet  Nutrition Counseling: Motivational Interviewing and Health Belief Model  Coordination of Care: None        Nutrition Recommendations:  1) Cut current volume of starchy and refined carbohydrates down to portioned levels discussed during appointment. Remember, carbohydrates are essential to providing the body with energy and nutrients and they should not be avoided with diabetes or other conditions. It becomes extremely important to manage the portion control/volume of such carbohydrates, and the timing of eating them. Breakfast should be consumed within 1-2 hours of waking, lunch 4-5 hours after breakfast, and dinner 4-5 hours after lunch. A healthy and balanced snack can be eaten in the evening, and one snack can be eaten between one of your meals.  Eating consistently, and with measured portions, carbohydrates will help manage blood sugar levels and help reduce excessive hunger in the evenings.   2) Consume balanced meals - breakfast, lunch, dinner - to promote management of blood sugar levels, management of metabolism, and promote satiety. Use the My Plate handout from the appointment as a tool to ensure that each meal, and snack, contains fiber-rich carbohydrates, lean proteins, and fibrous vegetables. When combined, these types of foods can help slow down digestion and absorption, thereby helping manage excessive rises in blood sugar levels.  3) Increase fibrous vegetable intake. Women are recommended to consume at least 25 grams of fiber daily. Plants, such as vegetables, fruits, whole grains, legumes, nuts, and seeds will help reduce inflammation in your GI tract, help repair and restore lining and integrity of GI tract,  "manage blood sugar levels, reduce cholesterol and lipid levels, enhance metabolic functioning, improve weight status, and promote satiety. Fiber from plants does not count as calories, and is considered extremely healthful.  4) Begin building and incorporating \"Nourish Bowls\" into your dietary patterning. This powerful phytonutrient bowl helps deliver an exceptional array of phytonutrients, vitamins, minerals, lean protein, fiber-rich carbohydrates, heart healthy fats, and immune boosting antioxidants. You can eat the nourish bowls for lunch as an excellent way to promote nutrient intake midday, or you can eat them in the evening to promote lasting satiety that prevents overconsumption of snacks or other foods at night.  5) Prioritizing your protein helps control appetite and helps to maintain lean (muscular) body mass while helping with weight loss, thus it is important to ensure you that you eat enough protein each day. Do not sacrifice protein to reduce calories, and remember to try to get approximately 25-30 grams with each meal you eat in the day. Optimize protein QUALITY - chicken, turkey, fish/seafood, tofu/plant-based options, lean beef, egg whites - as the fattier options will slow or prevent weight loss. Pairing quality protein with fibrous vegetables and fruits will work wonders in managing metabolic functioning that will ultimately help with weight loss.  6) Please limit or exclude processed foods in the diet as they are known to contribute to obesity, weight gain, and metabolic imbalance/dysfunction. Processed foods include food items like ice cream, chips, crackers, cereals, cookies, traditional wheat pastas and breads, rincon, fried chicken, hot dogs, fries, pizza, various breads and buns, donuts, pops and sodas, etc. These foods will typically come packaged in bags and boxes and bags with barcodes, and generally found in the middle aisles of the grocery store. These items often contain excessive " amounts of added sugars and refined flours, are low in fiber, high on sodium, high in saturated fats, and may stimulate appetite rather than help manage it effectively. These foods are shown to contribute to elevated weight status, and are often culprits in preventing loss of weight.       Educational Handouts: ADA My Plate, PBP, WG A-Z, DGDD, ACLM NB's    Readiness to Change : Good  Level of Understanding : Good  Anticipated Compliant : Good

## 2024-01-29 ENCOUNTER — OFFICE VISIT (OUTPATIENT)
Dept: ORTHOPEDIC SURGERY | Facility: CLINIC | Age: 59
End: 2024-01-29
Payer: COMMERCIAL

## 2024-01-29 VITALS — HEIGHT: 62 IN | WEIGHT: 221 LBS | BODY MASS INDEX: 40.67 KG/M2

## 2024-01-29 DIAGNOSIS — S93.601A SPRAIN OF RIGHT FOOT, INITIAL ENCOUNTER: Primary | ICD-10-CM

## 2024-01-29 DIAGNOSIS — M19.071 ARTHRITIS OF RIGHT MIDFOOT: ICD-10-CM

## 2024-01-29 PROCEDURE — L4361 PNEUMA/VAC WALK BOOT PRE OTS: HCPCS

## 2024-01-29 PROCEDURE — 99204 OFFICE O/P NEW MOD 45 MIN: CPT

## 2024-01-29 RX ORDER — MELOXICAM 7.5 MG/1
7.5 TABLET ORAL 2 TIMES DAILY
Qty: 60 TABLET | Refills: 1 | Status: SHIPPED | OUTPATIENT
Start: 2024-01-29 | End: 2024-04-06

## 2024-01-29 ASSESSMENT — PAIN - FUNCTIONAL ASSESSMENT: PAIN_FUNCTIONAL_ASSESSMENT: 0-10

## 2024-01-29 ASSESSMENT — PAIN SCALES - GENERAL: PAINLEVEL_OUTOF10: 8

## 2024-01-29 NOTE — PROGRESS NOTES
PRIMARY CARE PHYSICIAN: Manjinder Maldonado MD  REFERRING PROVIDER: No referring provider defined for this encounter.     CONSULT ORTHOPAEDIC: Knee Evaluation    ASSESSMENT & PLAN    Impression: 58 y.o. female with acute right lower extremity arthralgia of unknown etiology most likely sprain of right foot aggravating midfoot arthritis    Plan:   I explained to the patient the nature of their diagnosis.  I reviewed their imaging studies with them.    Based on the history, physical exam and imaging studies above, the patient's presentation is consistent with the above diagnosis.  I had a long discussion with the patient regarding their presentation and the treatment options.  Patient is diffusely painful from right knee to right foot making it difficult to identify the source of her pain.  No fractures were identified on x-ray images taken in the ED on 12/26/2023. We discussed conservative management at this time.  She was placed in a tall cam walking boot which she will wear for the next 4 weeks as she tolerates. She may come out of her boot to perform gentle range of motions. She was provided with a prescription for meloxicam to help with the pain and inflammation.  She will ice and rest the right lower extremity.  We discussed that if her pain does not improve with the above plan we will proceed with further diagnostic imaging with a CT versus MRI of the right foot.  Patient will follow-up with me in 1 month.    Follow-Up: Patient will follow-up in 4 weeks    At the end of the visit, all questions were answered in full. The patient is in agreement with the plan and recommendations. They will call the office with any questions/concerns.    Note dictated with [employer] software. Completed without full typed error editing and sent to avoid delay.       SUBJECTIVE  CHIEF COMPLAINT:   Chief Complaint   Patient presents with    Right Knee - Pain    Right Ankle - Pain        HPI: Blossom Ann is a 58  y.o. patient who complains of right knee pain.  Pain began on 2023 after her granddaughter jumped off a bunk bed and landed on her right knee and ankle/foot.  Patient states she had significant pain and swelling causing her to present to the emergency room on 2023.  X-ray images of the right knee, right tibia-fibula, and right foot were obtained demonstrating no acute fracture.  She was placed in a postop shoe, provided crutches, and told to rest ice and elevate the right lower extremity.  Patient states that she continues to have pain and swelling of the right foot and right knee.  She ambulates with a limp.  She notes most of her pain is in the right foot/ankle.  She has difficulty localizing the pain.  She has tried Tylenol and ibuprofen with minimal relief.    They deny any constant or progressive numbness or tingling in their legs.     REVIEW OF SYSTEMS  Constitutional: See HPI for pain assessment, No significant weight loss, recent trauma  Cardiovascular: No chest pain, shortness of breath  Respiratory: No difficulty breathing, cough  Gastrointestinal: No nausea, vomiting, diarrhea, constipation  Musculoskeletal: Noted in HPI, positive for pain, restricted motion, stiffness  Integumentary: No rashes, easy bruising, redness   Neurological: no numbness or tingling in extremities, no gait disturbances   Psychiatric: No mood changes, memory changes, social issues  Heme/Lymph: no excessive swelling, easy bruising, excessive bleeding  ENT: No hearing changes  Eyes: No vision changes    Past Medical History:   Diagnosis Date    Personal history of other specified conditions     History of vertigo        Allergies   Allergen Reactions    Neomycin-Bacitracin-Polymyxin Other     Skin irritation        Past Surgical History:   Procedure Laterality Date    OTHER SURGICAL HISTORY  2020     section    OTHER SURGICAL HISTORY  2020    Nasal septoplasty    OTHER SURGICAL HISTORY  2020     Tonsillectomy with adenoidectomy    OTHER SURGICAL HISTORY  01/09/2020    Shoulder surgery        Family History   Problem Relation Name Age of Onset    Breast cancer Mother      Diabetes Father      Prostate cancer Father      Hodgkin's lymphoma Sister      Esophageal cancer Sister      Lung cancer Other grandmother     Ovarian cancer Other grandmother         Social History     Socioeconomic History    Marital status:      Spouse name: Not on file    Number of children: Not on file    Years of education: Not on file    Highest education level: Not on file   Occupational History    Not on file   Tobacco Use    Smoking status: Not on file    Smokeless tobacco: Not on file   Substance and Sexual Activity    Alcohol use: Not on file    Drug use: Not on file    Sexual activity: Not on file   Other Topics Concern    Not on file   Social History Narrative    Not on file     Social Determinants of Health     Financial Resource Strain: Not on file   Food Insecurity: Not on file   Transportation Needs: Not on file   Physical Activity: Not on file   Stress: Not on file   Social Connections: Not on file   Intimate Partner Violence: Not on file   Housing Stability: Not on file        CURRENT MEDICATIONS:   Current Outpatient Medications   Medication Sig Dispense Refill    albuterol (ProAir HFA) 90 mcg/actuation inhaler Inhale.      amLODIPine (Norvasc) 10 mg tablet       metFORMIN (Glucophage) 500 mg tablet       Saline Mist 0.65 % nasal spray Administer 1 spray into each nostril if needed for congestion.      valsartan-hydrochlorothiazide (Diovan-HCT) 160-25 mg tablet Take 1 tablet by mouth once daily.       No current facility-administered medications for this visit.        OBJECTIVE    PHYSICAL EXAM      1/9/2020    10:00 AM 1/30/2020     9:17 AM 12/26/2023     7:36 PM 1/24/2024     1:12 PM   Vitals   Systolic 156  166    Diastolic 86  82    Heart Rate 85  81    Temp   36.7 °C (98 °F)    Resp   16    Height (in)  "1.575 m (5' 2\") 1.574 m (5' 1.97\") 1.575 m (5' 2\")    Weight (lb) 224 221.78 200 221   BMI 40.97 kg/m2 40.61 kg/m2 36.58 kg/m2 40.42 kg/m2   BSA (m2) 2.11 m2 2.1 m2 1.99 m2 2.09 m2      There is no height or weight on file to calculate BMI.    GENERAL: A/Ox3, NAD. Appears healthy, well nourished  PSYCHIATRIC: Mood stable, appropriate memory recall  EYES: EOM intact, no scleral icterus  CARDIOVASCULAR: Palpable peripheral pulses  LUNGS: Breathing non-labored on room air  SKIN: no erythema, rashes, or ecchymoses     MUSCULOSKELETAL:  Laterality: right Knee Exam  - Skin intact  - No erythema or warmth  - No ecchymosis or soft tissue swelling  - Alignment: neutral  - Palpation: positive tenderness proximal fibula and medial joint line  - ROM: 0-0-100  - Effusion: trace  - Strength: knee extension and flexion 5/5, EHL/PF/DF motor intact  - Stability:        Anterior drawer stable       Posterior drawer stable       Varus/valgus stable       negative Lachman  - Equivocal Noel's  - Gait: antalgic  - Hip Exam: flexion to 100+ degrees, full extension, internal/external rotation adequate, and no pain with log roll    Right Foot/Ankle  - Skin intact  - No erythema or warmth  - No ecchymosis. Some diffuse soft tissue swelling  - Alignment: neutral  - Palpation: Positive tenderness over the MTP joints. Some tenderness of medial and lateral malleolus, ATFL.   - ROM: Limited ROM due to pain.   - Effusion: mild  - Strength: Normal strength in ankle plantar flexion, dorsiflexion, inversion and eversion; normal strength with great toe flexion/extension  - Stability:        Anterior drawer stable but painful       Inversion/Eversion stable but painful  - Achilles tendon palpable and intact; Chavez test negative  - Gait: antalgic  - Special Tests: Positive Squeeze test    NEUROVASCULAR:  - Neurovascular Status: sensation intact to light touch distally, lower extremity motor intact  - Capillary refill brisk at extremities, " Bilateral dorsalis pedis pulse 2+    Imaging:   Multiple views of the affected right knee(s) demonstrate: no fracture, no acute osseous abnormality.       Multiple views of the affected right foot demonstrate: Evidence of moderate arthritic changes involving the midfoot. No acute osseous abnormality. X-rays were personally reviewed and interpreted by me.  Radiology reports were reviewed by me as well, if readily available at the time.

## 2024-02-26 ENCOUNTER — OFFICE VISIT (OUTPATIENT)
Dept: ORTHOPEDIC SURGERY | Facility: CLINIC | Age: 59
End: 2024-02-26
Payer: COMMERCIAL

## 2024-02-26 VITALS — HEIGHT: 62 IN | BODY MASS INDEX: 40.67 KG/M2 | WEIGHT: 221 LBS

## 2024-02-26 DIAGNOSIS — S93.601A SPRAIN OF RIGHT FOOT, INITIAL ENCOUNTER: ICD-10-CM

## 2024-02-26 DIAGNOSIS — M19.071 ARTHRITIS OF RIGHT MIDFOOT: Primary | ICD-10-CM

## 2024-02-26 PROCEDURE — 99213 OFFICE O/P EST LOW 20 MIN: CPT

## 2024-02-26 ASSESSMENT — PAIN - FUNCTIONAL ASSESSMENT: PAIN_FUNCTIONAL_ASSESSMENT: 0-10

## 2024-02-26 ASSESSMENT — PAIN SCALES - GENERAL: PAINLEVEL_OUTOF10: 5 - MODERATE PAIN

## 2024-02-26 NOTE — PROGRESS NOTES
PRIMARY CARE PHYSICIAN: Manjinder Maldonado MD  ORTHOPAEDIC FOLLOW-UP: Ankle Evaluation    ASSESSMENT & PLAN    Impression: 58 y.o. female with right foot pain    Plan:   I reviewed with the patient the nature of their diagnosis.  I reviewed their imaging studies with them.    Based on the history, physical exam and imaging studies above, the patient's presentation is consistent with the above diagnosis.  I had a long discussion with the patient regarding their presentation and the treatment options. Patient has been ambulating in a tall cam walking boot for the past 4 weeks.  Her pain has improved however she now has more localized tenderness to the second through fourth MTP joints of the right foot.  On review of the x-ray images taken in the ED from 12/26/2023 no acute osseous abnormality or fracture is identified.  As she continues to have pain more localized to the right foot I recommend that she follows up with foot and ankle specialist Dr. Rodgers to discuss further treatment options.  She may transition from her tall cam walking boot to a the postop shoe at the time.  She will wear the postop shoe until she sees Dr. Rodgers.  She will continue to ice and rest the foot and take meloxicam as needed.    Follow-Up: Patient will follow-up in 1 week with Dr. Guerin    At the end of the visit, all questions were answered in full. The patient is in agreement with the plan and recommendations. They will call the office with any questions/concerns.    Note dictated with [employer] software. Completed without full typed error editing and sent to avoid delay.     SUBJECTIVE  CHIEF COMPLAINT:   Chief Complaint   Patient presents with    Right Ankle - Follow-up        HPI: Blossom Ann is a 58 y.o. patient who presents today for follow-up of persistent pain in her ankle/foot. Pain began on 12/25/2023 after her granddaughter jumped off a bunk bed and landed on her right knee and ankle/foot. She presented  to the emergency room on 2023.  X-ray images of the right knee, right tibia-fibula, and right foot were obtained demonstrating no acute fracture.  She presents today ambulating in her boot provided to her last visit.  Her swelling has improved however she continues to have pain over the distal right foot.  She would like to discontinue with the boot as she feels it is very cumbersome.  She has tried the meloxicam which provide some relief.    REVIEW OF SYSTEMS  Constitutional: See HPI for pain assessment, No significant weight loss, recent trauma  Cardiovascular: No chest pain, shortness of breath  Respiratory: No difficulty breathing, cough  Gastrointestinal: No nausea, vomiting, diarrhea, constipation  Musculoskeletal: Noted in HPI, positive for pain, restricted motion, stiffness  Integumentary: No rashes, easy bruising, redness   Neurological: no numbness or tingling in extremities, no gait disturbances   Psychiatric: No mood changes, memory changes, social issues  Heme/Lymph: no excessive swelling, easy bruising, excessive bleeding  ENT: No hearing changes  Eyes: No vision changes    Past Medical History:   Diagnosis Date    Personal history of other specified conditions     History of vertigo        Allergies   Allergen Reactions    Neomycin-Bacitracin-Polymyxin Other     Skin irritation        Past Surgical History:   Procedure Laterality Date    OTHER SURGICAL HISTORY  2020     section    OTHER SURGICAL HISTORY  2020    Nasal septoplasty    OTHER SURGICAL HISTORY  2020    Tonsillectomy with adenoidectomy    OTHER SURGICAL HISTORY  2020    Shoulder surgery        Family History   Problem Relation Name Age of Onset    Breast cancer Mother      Diabetes Father      Prostate cancer Father      Hodgkin's lymphoma Sister      Esophageal cancer Sister      Lung cancer Other grandmother     Ovarian cancer Other grandmother         Social History     Socioeconomic History     "Marital status:      Spouse name: Not on file    Number of children: Not on file    Years of education: Not on file    Highest education level: Not on file   Occupational History    Not on file   Tobacco Use    Smoking status: Unknown    Smokeless tobacco: Not on file   Substance and Sexual Activity    Alcohol use: Not on file    Drug use: Not on file    Sexual activity: Not on file   Other Topics Concern    Not on file   Social History Narrative    Not on file     Social Determinants of Health     Financial Resource Strain: Not on file   Food Insecurity: Not on file   Transportation Needs: Not on file   Physical Activity: Not on file   Stress: Not on file   Social Connections: Not on file   Intimate Partner Violence: Not on file   Housing Stability: Not on file        CURRENT MEDICATIONS:   Current Outpatient Medications   Medication Sig Dispense Refill    albuterol (ProAir HFA) 90 mcg/actuation inhaler Inhale.      amLODIPine (Norvasc) 10 mg tablet       meloxicam (Mobic) 7.5 mg tablet Take 1 tablet (7.5 mg) by mouth 2 times a day. For pain, inflammation, and swelling 60 tablet 1    metFORMIN (Glucophage) 500 mg tablet       Saline Mist 0.65 % nasal spray Administer 1 spray into each nostril if needed for congestion.      valsartan-hydrochlorothiazide (Diovan-HCT) 160-25 mg tablet Take 1 tablet by mouth once daily.       No current facility-administered medications for this visit.        OBJECTIVE    PHYSICAL EXAM      1/9/2020    10:00 AM 1/30/2020     9:17 AM 12/26/2023     7:36 PM 1/24/2024     1:12 PM 1/29/2024    11:41 AM   Vitals   Systolic 156  166     Diastolic 86  82     Heart Rate 85  81     Temp   36.7 °C (98 °F)     Resp   16     Height (in) 1.575 m (5' 2\") 1.574 m (5' 1.97\") 1.575 m (5' 2\")  1.575 m (5' 2\")   Weight (lb) 224 221.78 200 221 221   BMI 40.97 kg/m2 40.61 kg/m2 36.58 kg/m2 40.42 kg/m2 40.42 kg/m2   BSA (m2) 2.11 m2 2.1 m2 1.99 m2 2.09 m2 2.09 m2   Visit Report     Report      There " is no height or weight on file to calculate BMI.    GENERAL: A/Ox3, NAD. Appears healthy, well nourished  PSYCHIATRIC: Mood stable, appropriate memory recall  EYES: EOM intact, no scleral icterus  CARDIOVASCULAR: Palpable peripheral pulses  LUNGS: Breathing non-labored on room air  SKIN: no erythema, rashes, or ecchymoses     MUSCULOSKELETAL:  Right Foot/Ankle  - Skin intact  - No erythema or warmth  - No ecchymosis. Some diffuse soft tissue swelling  - Alignment: neutral  - Palpation: Positive tenderness over the second through fourth MTP joints  - ROM: Full range of motion  - Effusion: None  - Strength: Normal strength in ankle plantar flexion, dorsiflexion, inversion and eversion; normal strength with great toe flexion/extension  - Stability:        Anterior drawer stable        Inversion/Eversion stable   - Achilles tendon palpable and intact; Chavez test negative  - Gait: antalgic  - Special Tests: Positive Squeeze test    NEUROVASCULAR:  - Neurovascular Status: sensation intact to light touch distally, lower extremity motor intact  - Capillary refill brisk at extremities, Bilateral dorsalis pedis pulse 2+    Imaging: No new imaging

## 2024-03-01 DIAGNOSIS — M19.071 ARTHRITIS OF RIGHT MIDFOOT: ICD-10-CM

## 2024-03-01 DIAGNOSIS — S93.601A SPRAIN OF RIGHT FOOT, INITIAL ENCOUNTER: ICD-10-CM

## 2024-03-05 ENCOUNTER — HOSPITAL ENCOUNTER (OUTPATIENT)
Dept: RADIOLOGY | Facility: HOSPITAL | Age: 59
Discharge: HOME | End: 2024-03-05
Payer: COMMERCIAL

## 2024-03-05 ENCOUNTER — OFFICE VISIT (OUTPATIENT)
Dept: ORTHOPEDIC SURGERY | Facility: CLINIC | Age: 59
End: 2024-03-05
Payer: COMMERCIAL

## 2024-03-05 DIAGNOSIS — S93.601A SPRAIN OF RIGHT FOOT, INITIAL ENCOUNTER: ICD-10-CM

## 2024-03-05 DIAGNOSIS — M19.071 ARTHRITIS OF RIGHT MIDFOOT: Primary | ICD-10-CM

## 2024-03-05 DIAGNOSIS — M19.071 ARTHRITIS OF RIGHT MIDFOOT: ICD-10-CM

## 2024-03-05 PROCEDURE — 73630 X-RAY EXAM OF FOOT: CPT | Mod: RIGHT SIDE | Performed by: STUDENT IN AN ORGANIZED HEALTH CARE EDUCATION/TRAINING PROGRAM

## 2024-03-05 PROCEDURE — 99203 OFFICE O/P NEW LOW 30 MIN: CPT | Performed by: SPECIALIST

## 2024-03-05 PROCEDURE — 73630 X-RAY EXAM OF FOOT: CPT | Mod: RT

## 2024-03-05 NOTE — PROGRESS NOTES
New patient ER follow up   Saw Gabrielle Lopresti PA 2/26/2024 - for a mid foot fracture 12 /24/23.  She states her grandson landed on her right leg and foot.  She mildly hyperextended the knee with some pain that resolving but complains mostly of midfoot pain and swelling on the right.  Here for assessment.    Exam: Right foot with no ecchymosis and mild diffuse swelling over the midfoot.  She has pain both palpation and rotational stress through Lisfranc's.  No significant ankle or hindfoot pain.  Dermis is intact neurovascular intact    Radiographs: 3 view standing right foot shows a stable ankle mortise and some degenerative changes significant of the midfoot no obvious acute fractures no subluxation.    Assessment and plan right midfoot osteoarthritis.  This obviously predated her recent injury but the injury finally brought the pain of the arthritis to light.  This will be to treat treated conservatively with medication Achilles stretching and custom foot orthotics which are written for.  Follow-up if no improvement    Addendum I right knee with minimal joint line pain no effusion full range of motion stable ligaments.  Reviewed radiographs were negative knee: Knee also.

## 2024-04-06 DIAGNOSIS — S93.601A SPRAIN OF RIGHT FOOT, INITIAL ENCOUNTER: ICD-10-CM

## 2024-04-06 DIAGNOSIS — M19.071 ARTHRITIS OF RIGHT MIDFOOT: ICD-10-CM

## 2024-04-06 RX ORDER — MELOXICAM 7.5 MG/1
TABLET ORAL
Qty: 60 TABLET | Refills: 1 | Status: SHIPPED | OUTPATIENT
Start: 2024-04-06

## 2024-04-29 ENCOUNTER — OFFICE VISIT (OUTPATIENT)
Dept: PRIMARY CARE CLINIC | Age: 59
End: 2024-04-29
Payer: COMMERCIAL

## 2024-04-29 VITALS
WEIGHT: 228.6 LBS | TEMPERATURE: 97.7 F | OXYGEN SATURATION: 95 % | SYSTOLIC BLOOD PRESSURE: 130 MMHG | HEART RATE: 78 BPM | HEIGHT: 62 IN | BODY MASS INDEX: 42.07 KG/M2 | DIASTOLIC BLOOD PRESSURE: 76 MMHG

## 2024-04-29 DIAGNOSIS — Z76.89 ESTABLISHING CARE WITH NEW DOCTOR, ENCOUNTER FOR: ICD-10-CM

## 2024-04-29 DIAGNOSIS — Z13.220 SCREENING CHOLESTEROL LEVEL: ICD-10-CM

## 2024-04-29 DIAGNOSIS — R73.03 PREDIABETES: Primary | ICD-10-CM

## 2024-04-29 DIAGNOSIS — Z12.31 ENCOUNTER FOR SCREENING MAMMOGRAM FOR MALIGNANT NEOPLASM OF BREAST: ICD-10-CM

## 2024-04-29 DIAGNOSIS — B37.9 YEAST INFECTION: ICD-10-CM

## 2024-04-29 DIAGNOSIS — R30.0 DYSURIA: ICD-10-CM

## 2024-04-29 LAB
ALBUMIN: 4.2 G/DL (ref 3.5–5.2)
ALP BLD-CCNC: 110 U/L (ref 35–104)
ALT SERPL-CCNC: 64 U/L (ref 0–32)
ANION GAP SERPL CALCULATED.3IONS-SCNC: 15 MMOL/L (ref 7–16)
AST SERPL-CCNC: 76 U/L (ref 0–31)
BILIRUB SERPL-MCNC: 1.2 MG/DL (ref 0–1.2)
BILIRUBIN, POC: NORMAL
BLOOD URINE, POC: NORMAL
BUN BLDV-MCNC: 13 MG/DL (ref 6–23)
CALCIUM SERPL-MCNC: 10 MG/DL (ref 8.6–10.2)
CHLORIDE BLD-SCNC: 99 MMOL/L (ref 98–107)
CHOLESTEROL: 144 MG/DL
CLARITY, POC: NORMAL
CO2: 25 MMOL/L (ref 22–29)
COLOR, POC: NORMAL
CREAT SERPL-MCNC: 0.7 MG/DL (ref 0.5–1)
GFR SERPL CREATININE-BSD FRML MDRD: >90 ML/MIN/1.73M2
GLUCOSE BLD-MCNC: 165 MG/DL (ref 74–99)
GLUCOSE URINE, POC: NORMAL
HBA1C MFR BLD: 8.7 % (ref 4–5.6)
HCT VFR BLD CALC: 44.9 % (ref 34–48)
HDLC SERPL-MCNC: 37 MG/DL
HEMOGLOBIN: 15 G/DL (ref 11.5–15.5)
KETONES, POC: NORMAL
LDL CHOLESTEROL: 61 MG/DL
LEUKOCYTE EST, POC: NORMAL
MCH RBC QN AUTO: 31.3 PG (ref 26–35)
MCHC RBC AUTO-ENTMCNC: 33.4 G/DL (ref 32–34.5)
MCV RBC AUTO: 93.5 FL (ref 80–99.9)
NITRITE, POC: NORMAL
PDW BLD-RTO: 13 % (ref 11.5–15)
PH, POC: 5.5
PLATELET, FLUORESCENCE: 126 K/UL (ref 130–450)
PMV BLD AUTO: 11.2 FL (ref 7–12)
POTASSIUM SERPL-SCNC: 3.5 MMOL/L (ref 3.5–5)
PROTEIN, POC: NORMAL
RBC # BLD: 4.8 M/UL (ref 3.5–5.5)
SODIUM BLD-SCNC: 139 MMOL/L (ref 132–146)
SPECIFIC GRAVITY, POC: 1.03
TOTAL PROTEIN: 7.4 G/DL (ref 6.4–8.3)
TRIGL SERPL-MCNC: 231 MG/DL
UROBILINOGEN, POC: 0.2
VLDLC SERPL CALC-MCNC: 46 MG/DL
WBC # BLD: 5.4 K/UL (ref 4.5–11.5)

## 2024-04-29 PROCEDURE — 81002 URINALYSIS NONAUTO W/O SCOPE: CPT | Performed by: FAMILY MEDICINE

## 2024-04-29 PROCEDURE — 99204 OFFICE O/P NEW MOD 45 MIN: CPT | Performed by: FAMILY MEDICINE

## 2024-04-29 RX ORDER — VALSARTAN AND HYDROCHLOROTHIAZIDE 160; 25 MG/1; MG/1
1 TABLET ORAL DAILY
COMMUNITY
Start: 2024-04-15

## 2024-04-29 RX ORDER — FLUCONAZOLE 150 MG/1
150 TABLET ORAL ONCE
Qty: 1 TABLET | Refills: 0 | Status: SHIPPED | OUTPATIENT
Start: 2024-04-29 | End: 2024-04-29

## 2024-04-29 RX ORDER — MELOXICAM 7.5 MG/1
7.5 TABLET ORAL DAILY
COMMUNITY
Start: 2024-04-06

## 2024-04-29 RX ORDER — AMLODIPINE BESYLATE 10 MG/1
10 TABLET ORAL DAILY
COMMUNITY
Start: 2024-04-15

## 2024-04-29 RX ORDER — BLOOD SUGAR DIAGNOSTIC
STRIP MISCELLANEOUS
COMMUNITY
Start: 2024-02-10

## 2024-04-29 NOTE — PROGRESS NOTES
[facility]  Family Medicine      Patient:  Sonia Murphy 58 y.o. female  Date of Service: 4/29/24      Chief complaint:   Chief Complaint   Patient presents with    Care Management     New to provider pt has a question about diabetes          History of Present Illness   Sonia Murphy is a 58 y.o. female who presents to the clinic with complaints as above.    Establish Care with New Physician  Medical History discussed and updated in chart  Surgical History discussed and updated in chart  Social History discussed and updated in chart  Medications reviewed and updated in chart as appropriate  See below for Acute/Chronic conditions addressed today    Concern for Diabetes Mellitus  She has been told by her insurance company that her sugar is high  No results found for: \"LABA1C\", \"[id number]\"  A1C today drawn  Medications include metformin 500mg daily, taking as prescribed  Blood sugars at home are  not taken  Denies symptoms of high or low blood sugars, medication side effects     Dysuria  For the past few days  Pain and burning with urination, does also have significant itching  Was recently on amoxil  Denies fever, chills, chest pain, shortness of breath, abdominal pain, nausea, vomiting, diarrhea, numbness, tingling, loss of bowel or bladder control     Current Health Maintenance:  Health Maintenance   Topic Date Due    COVID-19 Vaccine (1) Never done    Depression Screen  Never done    HIV screen  Never done    Hepatitis C screen  Never done    Cervical cancer screen  Never done    Diabetes screen  Never done    Colorectal Cancer Screen  Never done    Shingles vaccine (1 of 2) Never done    Lipids  05/06/2019    DTaP/Tdap/Td vaccine (2 - Td or Tdap) 05/27/2019    Breast cancer screen  10/11/2021    Respiratory Syncytial Virus (RSV) Pregnant or age 60 yrs+ (1 - 1-dose 60+ series) Never done    Annual Wellness Visit (Medicare)  Never done    Flu vaccine (Season Ended) 08/01/2024    Hepatitis A

## 2024-04-30 ENCOUNTER — HOSPITAL ENCOUNTER (OUTPATIENT)
Dept: MAMMOGRAPHY | Age: 59
Discharge: HOME | End: 2024-05-02
Attending: FAMILY MEDICINE
Payer: COMMERCIAL

## 2024-04-30 VITALS — WEIGHT: 228 LBS | HEIGHT: 62 IN | BODY MASS INDEX: 41.96 KG/M2

## 2024-04-30 DIAGNOSIS — Z12.31 ENCOUNTER FOR SCREENING MAMMOGRAM FOR MALIGNANT NEOPLASM OF BREAST: ICD-10-CM

## 2024-04-30 PROCEDURE — 77063 BREAST TOMOSYNTHESIS BI: CPT

## 2024-05-02 LAB
CULTURE: ABNORMAL
SPECIMEN DESCRIPTION: ABNORMAL

## 2024-05-15 ENCOUNTER — OFFICE VISIT (OUTPATIENT)
Dept: PRIMARY CARE CLINIC | Age: 59
End: 2024-05-15
Payer: COMMERCIAL

## 2024-05-15 VITALS
HEIGHT: 62 IN | OXYGEN SATURATION: 94 % | SYSTOLIC BLOOD PRESSURE: 121 MMHG | BODY MASS INDEX: 41.48 KG/M2 | HEART RATE: 88 BPM | TEMPERATURE: 97.6 F | WEIGHT: 225.4 LBS | DIASTOLIC BLOOD PRESSURE: 73 MMHG

## 2024-05-15 DIAGNOSIS — B96.89 ACUTE BACTERIAL SINUSITIS: Primary | ICD-10-CM

## 2024-05-15 DIAGNOSIS — J01.90 ACUTE BACTERIAL SINUSITIS: Primary | ICD-10-CM

## 2024-05-15 PROCEDURE — 99213 OFFICE O/P EST LOW 20 MIN: CPT | Performed by: FAMILY MEDICINE

## 2024-05-15 RX ORDER — IPRATROPIUM BROMIDE 42 UG/1
2 SPRAY, METERED NASAL 4 TIMES DAILY
Qty: 15 ML | Refills: 3 | Status: SHIPPED | OUTPATIENT
Start: 2024-05-15

## 2024-05-15 RX ORDER — AMOXICILLIN AND CLAVULANATE POTASSIUM 875; 125 MG/1; MG/1
1 TABLET, FILM COATED ORAL 2 TIMES DAILY
Qty: 10 TABLET | Refills: 0 | Status: SHIPPED | OUTPATIENT
Start: 2024-05-15 | End: 2024-05-20

## 2024-05-15 RX ORDER — PREDNISONE 20 MG/1
20 TABLET ORAL 2 TIMES DAILY
Qty: 10 TABLET | Refills: 0 | Status: SHIPPED | OUTPATIENT
Start: 2024-05-15 | End: 2024-05-20

## 2024-05-15 RX ORDER — BUDESONIDE AND FORMOTEROL FUMARATE DIHYDRATE 160; 4.5 UG/1; UG/1
2 AEROSOL RESPIRATORY (INHALATION) 2 TIMES DAILY
Qty: 10.2 G | Refills: 2 | Status: SHIPPED | OUTPATIENT
Start: 2024-05-15

## 2024-05-15 SDOH — ECONOMIC STABILITY: HOUSING INSECURITY
IN THE LAST 12 MONTHS, WAS THERE A TIME WHEN YOU DID NOT HAVE A STEADY PLACE TO SLEEP OR SLEPT IN A SHELTER (INCLUDING NOW)?: NO

## 2024-05-15 SDOH — ECONOMIC STABILITY: FOOD INSECURITY: WITHIN THE PAST 12 MONTHS, YOU WORRIED THAT YOUR FOOD WOULD RUN OUT BEFORE YOU GOT MONEY TO BUY MORE.: NEVER TRUE

## 2024-05-15 SDOH — ECONOMIC STABILITY: FOOD INSECURITY: WITHIN THE PAST 12 MONTHS, THE FOOD YOU BOUGHT JUST DIDN'T LAST AND YOU DIDN'T HAVE MONEY TO GET MORE.: NEVER TRUE

## 2024-05-15 ASSESSMENT — PATIENT HEALTH QUESTIONNAIRE - PHQ9
SUM OF ALL RESPONSES TO PHQ9 QUESTIONS 1 & 2: 0
2. FEELING DOWN, DEPRESSED OR HOPELESS: NOT AT ALL
1. LITTLE INTEREST OR PLEASURE IN DOING THINGS: NOT AT ALL
SUM OF ALL RESPONSES TO PHQ QUESTIONS 1-9: 0

## 2024-05-15 NOTE — PROGRESS NOTES
[facility]  Family Medicine      Patient:  Sonia Murphy 58 y.o. female  Date of Service: 5/15/24      Chief complaint:   Chief Complaint   Patient presents with    Dizziness     Pt also has drainage and cough     Hyperglycemia         History of Present Illness   Sonia Murphy is a 58 y.o. female who presents to the clinic with complaints as above.    URI  For about 2 days  Also with dizziness and cough productive of white and green mucus, headaches, sinus pressure, sore throat, some diarrhea, some chills  Sugars have been higher than usual, discussed this can be normal with an illness  Does have hx asthma  Denies SOB, N/V, CP    Current Health Maintenance:  Health Maintenance   Topic Date Due    COVID-19 Vaccine (1) Never done    HIV screen  Never done    Hepatitis C screen  Never done    Cervical cancer screen  Never done    Colorectal Cancer Screen  Never done    Shingles vaccine (1 of 2) Never done    DTaP/Tdap/Td vaccine (2 - Td or Tdap) 2019    Respiratory Syncytial Virus (RSV) Pregnant or age 60 yrs+ (1 - 1-dose 60+ series) Never done    Annual Wellness Visit (Medicare Advantage)  Never done    Flu vaccine (Season Ended) 2024    A1C test (Diabetic or Prediabetic)  2025    Lipids  2025    Breast cancer screen  2025    Depression Screen  05/15/2025    Hepatitis A vaccine  Aged Out    Hepatitis B vaccine  Aged Out    Hib vaccine  Aged Out    Polio vaccine  Aged Out    Meningococcal (ACWY) vaccine  Aged Out    Pneumococcal 0-64 years Vaccine  Aged Out    Diabetes screen  Discontinued       Past Medical History:      Diagnosis Date    Asthma     Foot fracture, right     Hypertension     OA (osteoarthritis) of foot     Bilateral    Superior glenoid labrum lesion 02/10/2014       Past Surgical History:        Procedure Laterality Date    APPENDECTOMY      CARPAL TUNNEL RELEASE      bilateral     SECTION      x3    SHOULDER ARTHROSCOPY Right 14

## 2024-06-17 ENCOUNTER — OFFICE VISIT (OUTPATIENT)
Dept: PRIMARY CARE CLINIC | Age: 59
End: 2024-06-17
Payer: COMMERCIAL

## 2024-06-17 VITALS
WEIGHT: 225.6 LBS | HEIGHT: 62 IN | HEART RATE: 76 BPM | DIASTOLIC BLOOD PRESSURE: 73 MMHG | SYSTOLIC BLOOD PRESSURE: 121 MMHG | TEMPERATURE: 97.2 F | BODY MASS INDEX: 41.51 KG/M2 | OXYGEN SATURATION: 98 %

## 2024-06-17 DIAGNOSIS — R74.8 ELEVATED LIVER ENZYMES: ICD-10-CM

## 2024-06-17 DIAGNOSIS — E11.9 TYPE 2 DIABETES MELLITUS WITHOUT COMPLICATION, WITHOUT LONG-TERM CURRENT USE OF INSULIN (HCC): Primary | ICD-10-CM

## 2024-06-17 DIAGNOSIS — N39.41 URGE INCONTINENCE: ICD-10-CM

## 2024-06-17 DIAGNOSIS — Z13.0 SCREENING FOR BLOOD DISEASE: ICD-10-CM

## 2024-06-17 DIAGNOSIS — J45.20 MILD INTERMITTENT ASTHMA WITHOUT COMPLICATION: ICD-10-CM

## 2024-06-17 LAB
ALBUMIN: 3.8 G/DL (ref 3.5–5.2)
ALP BLD-CCNC: 95 U/L (ref 35–104)
ALT SERPL-CCNC: 43 U/L (ref 0–32)
ANION GAP SERPL CALCULATED.3IONS-SCNC: 12 MMOL/L (ref 7–16)
AST SERPL-CCNC: 46 U/L (ref 0–31)
BASOPHILS ABSOLUTE: 0.02 K/UL (ref 0–0.2)
BASOPHILS RELATIVE PERCENT: 1 % (ref 0–2)
BILIRUB SERPL-MCNC: 0.6 MG/DL (ref 0–1.2)
BUN BLDV-MCNC: 12 MG/DL (ref 6–23)
CALCIUM SERPL-MCNC: 9.2 MG/DL (ref 8.6–10.2)
CHLORIDE BLD-SCNC: 101 MMOL/L (ref 98–107)
CO2: 25 MMOL/L (ref 22–29)
CREAT SERPL-MCNC: 0.7 MG/DL (ref 0.5–1)
EOSINOPHILS ABSOLUTE: 0.08 K/UL (ref 0.05–0.5)
EOSINOPHILS RELATIVE PERCENT: 2 % (ref 0–6)
GFR, ESTIMATED: >90 ML/MIN/1.73M2
GLUCOSE BLD-MCNC: 156 MG/DL (ref 74–99)
HCT VFR BLD CALC: 42.7 % (ref 34–48)
HEMOGLOBIN: 14.4 G/DL (ref 11.5–15.5)
IMMATURE GRANULOCYTES %: 0 % (ref 0–5)
IMMATURE GRANULOCYTES ABSOLUTE: <0.03 K/UL (ref 0–0.58)
LYMPHOCYTES ABSOLUTE: 1.72 K/UL (ref 1.5–4)
LYMPHOCYTES RELATIVE PERCENT: 48 % (ref 20–42)
MCH RBC QN AUTO: 32.2 PG (ref 26–35)
MCHC RBC AUTO-ENTMCNC: 33.7 G/DL (ref 32–34.5)
MCV RBC AUTO: 95.5 FL (ref 80–99.9)
MONOCYTES ABSOLUTE: 0.27 K/UL (ref 0.1–0.95)
MONOCYTES RELATIVE PERCENT: 8 % (ref 2–12)
NEUTROPHILS ABSOLUTE: 1.46 K/UL (ref 1.8–7.3)
NEUTROPHILS RELATIVE PERCENT: 41 % (ref 43–80)
PDW BLD-RTO: 13.2 % (ref 11.5–15)
PLATELET # BLD: 112 K/UL (ref 130–450)
PMV BLD AUTO: 11.3 FL (ref 7–12)
POTASSIUM SERPL-SCNC: 4.2 MMOL/L (ref 3.5–5)
RBC # BLD: 4.47 M/UL (ref 3.5–5.5)
SODIUM BLD-SCNC: 138 MMOL/L (ref 132–146)
TOTAL PROTEIN: 6.8 G/DL (ref 6.4–8.3)
WBC # BLD: 3.6 K/UL (ref 4.5–11.5)

## 2024-06-17 PROCEDURE — 3052F HG A1C>EQUAL 8.0%<EQUAL 9.0%: CPT

## 2024-06-17 PROCEDURE — 99214 OFFICE O/P EST MOD 30 MIN: CPT

## 2024-06-17 PROCEDURE — 36415 COLL VENOUS BLD VENIPUNCTURE: CPT

## 2024-06-17 RX ORDER — METFORMIN HYDROCHLORIDE 500 MG/1
1000 TABLET, EXTENDED RELEASE ORAL
Qty: 180 TABLET | Refills: 1 | Status: SHIPPED | OUTPATIENT
Start: 2024-06-17

## 2024-06-17 RX ORDER — BUDESONIDE AND FORMOTEROL FUMARATE DIHYDRATE 160; 4.5 UG/1; UG/1
2 AEROSOL RESPIRATORY (INHALATION) 2 TIMES DAILY
Qty: 10.2 G | Refills: 2 | Status: SHIPPED | OUTPATIENT
Start: 2024-06-17

## 2024-06-17 RX ORDER — CETIRIZINE HYDROCHLORIDE 10 MG/1
10 TABLET ORAL NIGHTLY
Qty: 30 TABLET | Refills: 2 | Status: SHIPPED | OUTPATIENT
Start: 2024-06-17 | End: 2024-09-15

## 2024-06-17 ASSESSMENT — ENCOUNTER SYMPTOMS
WHEEZING: 0
SHORTNESS OF BREATH: 0
DIARRHEA: 0
CHEST TIGHTNESS: 0
CONSTIPATION: 0
ABDOMINAL PAIN: 0
NAUSEA: 0
VOMITING: 0

## 2024-06-17 NOTE — PROGRESS NOTES
Multiple Vitamins-Minerals (THERAPEUTIC MULTIVITAMIN-MINERALS) tablet Take 1 tablet by mouth daily       No current facility-administered medications for this visit.         Review of Systems:   Review of Systems   Constitutional:  Negative for chills, fatigue and fever.   Respiratory:  Negative for chest tightness, shortness of breath and wheezing.    Cardiovascular:  Negative for chest pain and palpitations.   Gastrointestinal:  Negative for abdominal pain, constipation, diarrhea, nausea and vomiting.   Genitourinary:  Negative for decreased urine volume, difficulty urinating, dysuria, frequency and pelvic pain.    as per HPI    Physical Exam   Vitals: /73   Pulse 76   Temp 97.2 °F (36.2 °C) (Temporal)   Ht 1.575 m (5' 2\")   Wt 102.3 kg (225 lb 9.6 oz)   LMP 12/09/2014 (Approximate)   SpO2 98%   BMI 41.26 kg/m²   Physical Exam  Constitutional:       Appearance: Normal appearance.   HENT:      Head: Normocephalic.   Eyes:      Pupils: Pupils are equal, round, and reactive to light.   Cardiovascular:      Rate and Rhythm: Normal rate and regular rhythm.      Pulses: Normal pulses.   Pulmonary:      Effort: Pulmonary effort is normal.      Breath sounds: Normal breath sounds.   Abdominal:      General: Abdomen is flat. There is no distension.      Palpations: Abdomen is soft.      Tenderness: There is no abdominal tenderness. There is no right CVA tenderness, left CVA tenderness or guarding.   Skin:     General: Skin is warm and dry.   Neurological:      General: No focal deficit present.      Mental Status: She is alert and oriented to person, place, and time.   Psychiatric:         Mood and Affect: Mood normal.         Behavior: Behavior normal.         Thought Content: Thought content normal.         Judgment: Judgment normal.         Assessment and Plan     1. Screening for blood disease  Will trend platelet levels.  - CBC with Auto Differential    2. Elevated liver enzymes  Will recheck liver

## 2024-06-18 LAB
HAV IGM SER IA-ACNC: NONREACTIVE
HEPATITIS B CORE IGM ANTIBODY: NONREACTIVE
HEPATITIS B SURF AG,XHBAGS: NONREACTIVE
HEPATITIS C ANTIBODY: NONREACTIVE

## 2024-06-20 DIAGNOSIS — J45.20 MILD INTERMITTENT ASTHMA WITHOUT COMPLICATION: Primary | ICD-10-CM

## 2024-06-20 RX ORDER — FLUTICASONE PROPIONATE AND SALMETEROL XINAFOATE 115; 21 UG/1; UG/1
2 AEROSOL, METERED RESPIRATORY (INHALATION) 2 TIMES DAILY
Qty: 1 EACH | Refills: 3 | Status: SHIPPED | OUTPATIENT
Start: 2024-06-20

## 2024-06-20 RX ORDER — FLUTICASONE PROPIONATE AND SALMETEROL XINAFOATE 115; 21 UG/1; UG/1
2 AEROSOL, METERED RESPIRATORY (INHALATION) 2 TIMES DAILY
Qty: 1 EACH | Refills: 3
Start: 2024-06-20 | End: 2024-06-20

## 2024-07-13 ENCOUNTER — APPOINTMENT (OUTPATIENT)
Dept: GENERAL RADIOLOGY | Age: 59
End: 2024-07-13
Payer: COMMERCIAL

## 2024-07-13 ENCOUNTER — HOSPITAL ENCOUNTER (EMERGENCY)
Age: 59
Discharge: HOME | End: 2024-07-13
Payer: COMMERCIAL

## 2024-07-13 VITALS
WEIGHT: 215 LBS | OXYGEN SATURATION: 96 % | TEMPERATURE: 98.3 F | SYSTOLIC BLOOD PRESSURE: 129 MMHG | HEART RATE: 87 BPM | RESPIRATION RATE: 16 BRPM | DIASTOLIC BLOOD PRESSURE: 80 MMHG | BODY MASS INDEX: 39.32 KG/M2
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
## 2024-07-13 DIAGNOSIS — T14.8XXA MUSCLE STRAIN: ICD-10-CM

## 2024-07-13 PROCEDURE — 71046 X-RAY EXAM CHEST 2 VIEWS: CPT

## 2024-07-13 PROCEDURE — 99211 OFF/OP EST MAY X REQ PHY/QHP: CPT

## 2024-07-13 PROCEDURE — 73030 X-RAY EXAM OF SHOULDER: CPT

## 2024-07-13 RX ORDER — CYCLOBENZAPRINE HCL 10 MG
10 TABLET ORAL 3 TIMES DAILY PRN
Qty: 12 TABLET | Refills: 0 | Status: SHIPPED | OUTPATIENT
Start: 2024-07-13 | End: 2024-07-17

## 2024-07-13 RX ORDER — MELOXICAM 7.5 MG/1
7.5 TABLET ORAL DAILY
Qty: 14 TABLET | Refills: 0 | Status: SHIPPED | OUTPATIENT
Start: 2024-07-13 | End: 2024-07-27

## 2024-07-13 ASSESSMENT — PAIN - FUNCTIONAL ASSESSMENT
PAIN_FUNCTIONAL_ASSESSMENT: NONE - DENIES PAIN
PAIN_FUNCTIONAL_ASSESSMENT: 0-10

## 2024-07-13 ASSESSMENT — PAIN DESCRIPTION - LOCATION: LOCATION: CHEST

## 2024-07-13 ASSESSMENT — PAIN DESCRIPTION - ORIENTATION: ORIENTATION: RIGHT

## 2024-07-13 ASSESSMENT — PAIN DESCRIPTION - PAIN TYPE: TYPE: ACUTE PAIN

## 2024-07-13 ASSESSMENT — PAIN DESCRIPTION - DESCRIPTORS: DESCRIPTORS: ACHING;DISCOMFORT;SORE

## 2024-07-13 ASSESSMENT — PAIN SCALES - GENERAL: PAINLEVEL_OUTOF10: 4

## 2024-07-13 NOTE — ED PROVIDER NOTES
Department of Emergency Medicine   ED  Encounter Note  Admit Date/RoomTime: 2024  1:52 PM  ED Room:     NAME: Sonia Murphy  : 1965  MRN: [id number]     Chief Complaint:  Fall (Fell last , injured right shoulder, upper chest, back, concerned she may have broken a rib on that right side)    History of Present Illness       Sonia Murphy is a 59 y.o. old female who presents to urgent care by private vehicle, for a mechanical fall which occured 1 week(s) prior to arrival. She reportedly tripped while at home prior to incident with complaints of right rib and right shoulder pain.  Since onset the symptoms have been stable.  Her pain is aggraveated by any movement and relieved by nothing, as no treatment has been provided prior to this visit.  She denies any head injury, loss of consciousness, neck pain, chest pain, abdominal pain, back pain, or extremity injury.  She takes no blood thinning agents.    ROS   Pertinent positives and negatives are stated within HPI, all other systems reviewed and are negative.    Past Medical History:  has a past medical history of Asthma, Foot fracture, right, Hypertension, OA (osteoarthritis) of foot, and Superior glenoid labrum lesion.    Surgical History:  has a past surgical history that includes  section; Tonsillectomy; sinus surgery; Appendectomy; Carpal tunnel release; and Shoulder arthroscopy (Right, 14).    Social History:  reports that she quit smoking about 48 years ago. Her smoking use included cigarettes. She has never been exposed to tobacco smoke. She has never used smokeless tobacco. She reports that she does not drink alcohol and does not use drugs.    Family History: family history includes Breast Cancer (age of onset: 79) in her mother; Ovarian Cancer (age of onset: 70) in her maternal grandmother; Prostate Cancer (age of onset: 78) in her father.     Allergies: Neosporin [bacitracin-neomycin-polymyxin]    Physical Exam

## 2024-07-17 DIAGNOSIS — E11.9 TYPE 2 DIABETES MELLITUS WITHOUT COMPLICATIONS (MULTI): Primary | ICD-10-CM

## 2024-07-26 RX ORDER — MELOXICAM 7.5 MG/1
7.5 TABLET ORAL DAILY
Qty: 14 TABLET | Refills: 0 | Status: SHIPPED | OUTPATIENT
Start: 2024-07-26 | End: 2024-08-09

## 2024-07-26 RX ORDER — AMLODIPINE BESYLATE 10 MG/1
10 TABLET ORAL DAILY
Qty: 30 TABLET | Refills: 2 | Status: SHIPPED | OUTPATIENT
Start: 2024-07-26

## 2024-07-26 RX ORDER — VALSARTAN AND HYDROCHLOROTHIAZIDE 160; 25 MG/1; MG/1
1 TABLET ORAL DAILY
Qty: 30 TABLET | Refills: 2 | Status: SHIPPED | OUTPATIENT
Start: 2024-07-26

## 2024-09-06 DIAGNOSIS — E11.9 TYPE 2 DIABETES MELLITUS WITHOUT COMPLICATION, WITHOUT LONG-TERM CURRENT USE OF INSULIN (HCC): ICD-10-CM

## 2024-09-06 RX ORDER — METFORMIN HCL 500 MG
1000 TABLET, EXTENDED RELEASE 24 HR ORAL
Qty: 180 TABLET | Refills: 1 | Status: SHIPPED | OUTPATIENT
Start: 2024-09-06

## 2024-09-06 RX ORDER — CETIRIZINE HYDROCHLORIDE 10 MG/1
10 TABLET ORAL NIGHTLY
Qty: 30 TABLET | Refills: 2 | Status: SHIPPED | OUTPATIENT
Start: 2024-09-06 | End: 2024-12-05

## 2024-09-06 RX ORDER — MELOXICAM 7.5 MG/1
7.5 TABLET ORAL DAILY
Qty: 30 TABLET | Refills: 0 | Status: SHIPPED | OUTPATIENT
Start: 2024-09-06 | End: 2024-10-06

## 2024-09-13 ENCOUNTER — HOSPITAL ENCOUNTER (EMERGENCY)
Age: 59
Discharge: HOME | End: 2024-09-13
Payer: COMMERCIAL

## 2024-09-13 VITALS
DIASTOLIC BLOOD PRESSURE: 74 MMHG | HEART RATE: 90 BPM | TEMPERATURE: 98.6 F | RESPIRATION RATE: 16 BRPM | BODY MASS INDEX: 39.56 KG/M2 | HEIGHT: 62 IN | SYSTOLIC BLOOD PRESSURE: 153 MMHG | OXYGEN SATURATION: 95 % | WEIGHT: 215 LBS

## 2024-09-13 DIAGNOSIS — J01.90 ACUTE SINUSITIS, RECURRENCE NOT SPECIFIED, UNSPECIFIED LOCATION: Primary | ICD-10-CM

## 2024-09-13 PROCEDURE — 99211 OFF/OP EST MAY X REQ PHY/QHP: CPT

## 2024-09-13 RX ORDER — AMOXICILLIN 875 MG
875 TABLET ORAL 2 TIMES DAILY
Qty: 14 TABLET | Refills: 0 | Status: SHIPPED | OUTPATIENT
Start: 2024-09-13 | End: 2024-09-20

## 2024-09-13 RX ORDER — METHYLPREDNISOLONE 4 MG
TABLET, DOSE PACK ORAL
Qty: 1 KIT | Refills: 0 | Status: SHIPPED | OUTPATIENT
Start: 2024-09-13

## 2024-09-13 ASSESSMENT — PAIN - FUNCTIONAL ASSESSMENT: PAIN_FUNCTIONAL_ASSESSMENT: 0-10

## 2024-09-13 ASSESSMENT — PAIN SCALES - GENERAL: PAINLEVEL_OUTOF10: 0

## 2024-09-16 ENCOUNTER — OFFICE VISIT (OUTPATIENT)
Dept: PRIMARY CARE CLINIC | Age: 59
End: 2024-09-16

## 2024-09-16 VITALS
SYSTOLIC BLOOD PRESSURE: 156 MMHG | HEIGHT: 62 IN | OXYGEN SATURATION: 98 % | DIASTOLIC BLOOD PRESSURE: 85 MMHG | WEIGHT: 224.6 LBS | HEART RATE: 80 BPM | RESPIRATION RATE: 16 BRPM | BODY MASS INDEX: 41.33 KG/M2

## 2024-09-16 DIAGNOSIS — E11.9 TYPE 2 DIABETES MELLITUS WITHOUT COMPLICATION, WITHOUT LONG-TERM CURRENT USE OF INSULIN (HCC): Primary | ICD-10-CM

## 2024-09-16 DIAGNOSIS — Z23 NEED FOR INFLUENZA VACCINATION: ICD-10-CM

## 2024-09-16 LAB
CREATININE URINE: 216.6 MG/DL (ref 29–226)
HBA1C MFR BLD: 8.2 %
MICROALBUMIN/CREAT 24H UR: 19 MG/L (ref 0–19)
MICROALBUMIN/CREAT UR-RTO: 9 MCG/MG CREAT (ref 0–30)

## 2024-09-16 RX ORDER — BLOOD SUGAR DIAGNOSTIC
1 STRIP MISCELLANEOUS 2 TIMES DAILY
Qty: 100 EACH | Refills: 2 | Status: SHIPPED | OUTPATIENT
Start: 2024-09-16

## 2024-10-21 RX ORDER — AMLODIPINE BESYLATE 10 MG/1
10 TABLET ORAL DAILY
Qty: 30 TABLET | Refills: 0 | OUTPATIENT
Start: 2024-10-21

## 2024-10-21 RX ORDER — VALSARTAN AND HYDROCHLOROTHIAZIDE 160; 25 MG/1; MG/1
1 TABLET ORAL DAILY
Qty: 30 TABLET | Refills: 0 | OUTPATIENT
Start: 2024-10-21

## 2024-11-01 RX ORDER — VALSARTAN AND HYDROCHLOROTHIAZIDE 160; 25 MG/1; MG/1
1 TABLET ORAL DAILY
Qty: 90 TABLET | Refills: 1 | Status: SHIPPED | OUTPATIENT
Start: 2024-11-01

## 2024-11-01 RX ORDER — AMLODIPINE BESYLATE 10 MG/1
10 TABLET ORAL DAILY
Qty: 90 TABLET | Refills: 1 | Status: SHIPPED | OUTPATIENT
Start: 2024-11-01

## 2024-11-01 NOTE — TELEPHONE ENCOUNTER
Name of Medication(s) Requested:  Requested Prescriptions     Pending Prescriptions Disp Refills    amLODIPine (NORVASC) 10 MG tablet 90 tablet 1     Sig: Take 1 tablet by mouth daily    valsartan-hydroCHLOROthiazide (DIOVAN-HCT) 160-25 MG per tablet 90 tablet 1     Sig: Take 1 tablet by mouth daily       Medication is on current medication list Yes    Dosage and directions were verified? Yes    Quantity verified: 90 day supply     Pharmacy Verified?  Yes    Last Appointment:  5/15/2024    Future appts:  Future Appointments   Date Time Provider Department Center   12/16/2024  9:00 AM Marielos Jefferson DO [facility] DEP        (If no appt send self scheduling link. .REFILLAPPT)  Scheduling request sent?     [] Yes  [x] No    Does patient need updated?  [] Yes  [x] No

## 2024-11-14 ENCOUNTER — OFFICE VISIT (OUTPATIENT)
Dept: PRIMARY CARE CLINIC | Age: 59
End: 2024-11-14
Payer: COMMERCIAL

## 2024-11-14 ENCOUNTER — TELEPHONE (OUTPATIENT)
Dept: PRIMARY CARE CLINIC | Age: 59
End: 2024-11-14

## 2024-11-14 VITALS
HEART RATE: 79 BPM | BODY MASS INDEX: 41.77 KG/M2 | TEMPERATURE: 97 F | DIASTOLIC BLOOD PRESSURE: 73 MMHG | WEIGHT: 227 LBS | HEIGHT: 62 IN | SYSTOLIC BLOOD PRESSURE: 151 MMHG | OXYGEN SATURATION: 96 %

## 2024-11-14 DIAGNOSIS — J40 BRONCHITIS: Primary | ICD-10-CM

## 2024-11-14 PROCEDURE — 99213 OFFICE O/P EST LOW 20 MIN: CPT | Performed by: FAMILY MEDICINE

## 2024-11-14 RX ORDER — CEFDINIR 300 MG/1
300 CAPSULE ORAL 2 TIMES DAILY
Qty: 14 CAPSULE | Refills: 0 | Status: SHIPPED | OUTPATIENT
Start: 2024-11-14 | End: 2024-11-21

## 2024-11-14 RX ORDER — GUAIFENESIN 600 MG/1
600 TABLET, EXTENDED RELEASE ORAL 2 TIMES DAILY
Qty: 30 TABLET | Refills: 0 | Status: SHIPPED | OUTPATIENT
Start: 2024-11-14 | End: 2024-11-29

## 2024-11-14 RX ORDER — DOXYCYCLINE HYCLATE 100 MG
100 TABLET ORAL 2 TIMES DAILY
Qty: 14 TABLET | Refills: 0 | Status: SHIPPED | OUTPATIENT
Start: 2024-11-14 | End: 2024-11-21

## 2024-11-14 RX ORDER — BENZONATATE 100 MG/1
100 CAPSULE ORAL 3 TIMES DAILY PRN
Qty: 30 CAPSULE | Refills: 0 | Status: SHIPPED | OUTPATIENT
Start: 2024-11-14 | End: 2024-11-24

## 2024-11-14 NOTE — TELEPHONE ENCOUNTER
Patient requesting a prescription for her handicap placard, it will  at the end of this month. Patient will  when ready.

## 2024-11-14 NOTE — PROGRESS NOTES
[facility]  Family Medicine      Patient:  Sonia Murphy 59 y.o. female  Date of Service: 11/14/24      Chief complaint:   Chief Complaint   Patient presents with    Sinus Problem     Ear pain also         History of Present Illness   Sonia Murphy is a 59 y.o. female who presents to the clinic with complaints as above.    Sinus Problem  For about 1.5 weeks  +sick contacts, with pneumonia  Sinus pain, congestion, cough productive, left ear pain  Denies fever, chills, chest pain, shortness of breath, abdominal pain, nausea, vomiting, diarrhea, numbness, tingling, loss of bowel or bladder control     Current Health Maintenance:  Health Maintenance   Topic Date Due    Diabetic foot exam  Never done    HIV screen  Never done    Diabetic retinal exam  Never done    Cervical cancer screen  Never done    Shingles vaccine (1 of 2) Never done    Pneumococcal 0-64 years Vaccine (2 of 2 - PCV) 10/31/2017    DTaP/Tdap/Td vaccine (2 - Td or Tdap) 05/27/2019    Respiratory Syncytial Virus (RSV) Pregnant or age 60 yrs+ (1 - 1-dose 60+ series) Never done    Annual Wellness Visit (Medicare Advantage)  Never done    COVID-19 Vaccine (1 - 2023-24 season) Never done    Lipids  04/29/2025    Breast cancer screen  04/30/2025    Depression Screen  05/15/2025    GFR test (Diabetes, CKD 3-4, OR last GFR 15-59)  06/17/2025    A1C test (Diabetic or Prediabetic)  09/16/2025    Diabetic Alb to Cr ratio (uACR) test  09/16/2025    Colorectal Cancer Screen  01/30/2030    Flu vaccine  Completed    Hepatitis C screen  Completed    Hepatitis A vaccine  Aged Out    Hepatitis B vaccine  Aged Out    Hib vaccine  Aged Out    Polio vaccine  Aged Out    Meningococcal (ACWY) vaccine  Aged Out    Diabetes screen  Discontinued       Past Medical History:      Diagnosis Date    Asthma     Foot fracture, right     Hypertension     OA (osteoarthritis) of foot     Bilateral    Superior glenoid labrum lesion 02/10/2014       Past Surgical

## 2024-12-03 ENCOUNTER — HOSPITAL ENCOUNTER (EMERGENCY)
Age: 59
Discharge: HOME | End: 2024-12-03
Payer: COMMERCIAL

## 2024-12-03 ENCOUNTER — APPOINTMENT (OUTPATIENT)
Dept: GENERAL RADIOLOGY | Age: 59
End: 2024-12-03
Payer: COMMERCIAL

## 2024-12-03 ENCOUNTER — HOSPITAL ENCOUNTER (OUTPATIENT)
Dept: ULTRASOUND IMAGING | Age: 59
Discharge: HOME | End: 2024-12-05
Payer: COMMERCIAL

## 2024-12-03 ENCOUNTER — HOSPITAL ENCOUNTER (OUTPATIENT)
Age: 59
Discharge: HOME | End: 2024-12-05
Payer: COMMERCIAL

## 2024-12-03 VITALS
SYSTOLIC BLOOD PRESSURE: 164 MMHG | BODY MASS INDEX: 40.24 KG/M2 | WEIGHT: 220 LBS | HEART RATE: 87 BPM | TEMPERATURE: 97.6 F | DIASTOLIC BLOOD PRESSURE: 76 MMHG | RESPIRATION RATE: 20 BRPM | OXYGEN SATURATION: 96 %

## 2024-12-03 DIAGNOSIS — M79.605 LEFT LEG PAIN: Primary | ICD-10-CM

## 2024-12-03 DIAGNOSIS — M79.605 LEFT LEG PAIN: ICD-10-CM

## 2024-12-03 LAB
ALBUMIN SERPL-MCNC: 3.8 G/DL (ref 3.5–5.2)
ALP SERPL-CCNC: 113 U/L (ref 35–104)
ALT SERPL-CCNC: 52 U/L (ref 0–32)
ANION GAP SERPL CALCULATED.3IONS-SCNC: 11 MMOL/L (ref 7–16)
AST SERPL-CCNC: 60 U/L (ref 0–31)
BASOPHILS # BLD: 0.01 K/UL (ref 0–0.2)
BASOPHILS NFR BLD: 0 % (ref 0–2)
BILIRUB SERPL-MCNC: 0.5 MG/DL (ref 0–1.2)
BUN SERPL-MCNC: 16 MG/DL (ref 6–23)
CALCIUM SERPL-MCNC: 9.6 MG/DL (ref 8.6–10.2)
CHLORIDE SERPL-SCNC: 101 MMOL/L (ref 98–107)
CO2 SERPL-SCNC: 27 MMOL/L (ref 22–29)
CREAT SERPL-MCNC: 0.8 MG/DL (ref 0.5–1)
EOSINOPHIL # BLD: 0.06 K/UL (ref 0.05–0.5)
EOSINOPHILS RELATIVE PERCENT: 2 % (ref 0–6)
ERYTHROCYTE [DISTWIDTH] IN BLOOD BY AUTOMATED COUNT: 12.7 % (ref 11.5–15)
GFR, ESTIMATED: 81 ML/MIN/1.73M2
GLUCOSE SERPL-MCNC: 129 MG/DL (ref 74–99)
HCT VFR BLD AUTO: 39.9 % (ref 34–48)
HGB BLD-MCNC: 13.3 G/DL (ref 11.5–15.5)
IMM GRANULOCYTES # BLD AUTO: <0.03 K/UL (ref 0–0.58)
IMM GRANULOCYTES NFR BLD: 0 % (ref 0–5)
INR PPP: 1.2
LYMPHOCYTES NFR BLD: 1.47 K/UL (ref 1.5–4)
LYMPHOCYTES RELATIVE PERCENT: 41 % (ref 20–42)
MCH RBC QN AUTO: 30.5 PG (ref 26–35)
MCHC RBC AUTO-ENTMCNC: 33.3 G/DL (ref 32–34.5)
MCV RBC AUTO: 91.5 FL (ref 80–99.9)
MONOCYTES NFR BLD: 0.29 K/UL (ref 0.1–0.95)
MONOCYTES NFR BLD: 8 % (ref 2–12)
NEUTROPHILS NFR BLD: 49 % (ref 43–80)
NEUTS SEG NFR BLD: 1.74 K/UL (ref 1.8–7.3)
PARTIAL THROMBOPLASTIN TIME: 37.2 SEC (ref 24.5–35.1)
PLATELET # BLD AUTO: 110 K/UL (ref 130–450)
PLATELET CONFIRMATION: NORMAL
PMV BLD AUTO: 10.6 FL (ref 7–12)
POTASSIUM SERPL-SCNC: 3.7 MMOL/L (ref 3.5–5)
PROT SERPL-MCNC: 6.7 G/DL (ref 6.4–8.3)
PROTHROMBIN TIME: 13.6 SEC (ref 9.3–12.4)
RBC # BLD AUTO: 4.36 M/UL (ref 3.5–5.5)
SODIUM SERPL-SCNC: 139 MMOL/L (ref 132–146)
WBC OTHER # BLD: 3.6 K/UL (ref 4.5–11.5)

## 2024-12-03 PROCEDURE — 93971 EXTREMITY STUDY: CPT

## 2024-12-03 PROCEDURE — 85730 THROMBOPLASTIN TIME PARTIAL: CPT

## 2024-12-03 PROCEDURE — 99284 EMERGENCY DEPT VISIT MOD MDM: CPT

## 2024-12-03 PROCEDURE — 85610 PROTHROMBIN TIME: CPT

## 2024-12-03 PROCEDURE — 73564 X-RAY EXAM KNEE 4 OR MORE: CPT

## 2024-12-03 PROCEDURE — 80053 COMPREHEN METABOLIC PANEL: CPT

## 2024-12-03 PROCEDURE — 85025 COMPLETE CBC W/AUTO DIFF WBC: CPT

## 2024-12-03 ASSESSMENT — PAIN DESCRIPTION - DESCRIPTORS: DESCRIPTORS: DISCOMFORT

## 2024-12-03 ASSESSMENT — PAIN SCALES - GENERAL: PAINLEVEL_OUTOF10: 10

## 2024-12-03 ASSESSMENT — PAIN DESCRIPTION - FREQUENCY: FREQUENCY: CONTINUOUS

## 2024-12-03 ASSESSMENT — PAIN - FUNCTIONAL ASSESSMENT
PAIN_FUNCTIONAL_ASSESSMENT: PREVENTS OR INTERFERES SOME ACTIVE ACTIVITIES AND ADLS
PAIN_FUNCTIONAL_ASSESSMENT: 0-10

## 2024-12-03 ASSESSMENT — PAIN DESCRIPTION - ONSET: ONSET: ON-GOING

## 2024-12-03 ASSESSMENT — PAIN DESCRIPTION - LOCATION: LOCATION: LEG

## 2024-12-03 ASSESSMENT — PAIN DESCRIPTION - ORIENTATION: ORIENTATION: LEFT;POSTERIOR

## 2024-12-03 ASSESSMENT — PAIN DESCRIPTION - PAIN TYPE: TYPE: ACUTE PAIN

## 2024-12-04 NOTE — ED PROVIDER NOTES
Independent [facility]ARA Visit.          [facility] EMERGENCY DEPARTMENT  EMERGENCY DEPARTMENT ENCOUNTER        Pt Name: Sonia Murphy  MRN: [id number]  Birthdate 1965  Date of evaluation: 12/3/2024  Provider: BK Ghosh - CNP  PCP: Marielos Jefferson DO  Note Started: 7:08 PM EST 12/3/24    CHIEF COMPLAINT       Chief Complaint   Patient presents with    Leg Pain     Posterior left leg pain x 3.5 weeks. NKI.       HISTORY OF PRESENT ILLNESS: 1 or more Elements     History from : Patient    Limitations to history : None    Sonia Murphy is a 59 y.o. female who presents to the emergency department for leg pain.  Patient states that she has had left lower extremity pain for the last month.  She states that she does not know of any known injury or trauma but may have strained or sprained it walking or twisting.  She states that the pain is intermittent she states that there is been intermittent swelling to she states it is mostly in the knee.  Patient states that she has not tried take anything for it.  She denies any chest pain shortness of breath she denies any numbness tingling or weakness she denies any warmth redness.  Patient states that she has no history of DVT or PE in the past denies any recent surgeries immobilization she is not on active chemo or radiation no hormone therapy.  Patient states that she does have some intermittent pain sometimes it is worse at night when she goes to sit down.  She denies any unexpected weight gain.    Nursing Notes were all reviewed and agreed with or any disagreements were addressed in the HPI.    REVIEW OF SYSTEMS :      Review of Systems   All other systems reviewed and are negative.      Positives and Pertinent negatives as per HPI.     SURGICAL HISTORY     Past Surgical History:   Procedure Laterality Date    APPENDECTOMY      CARPAL TUNNEL RELEASE      bilateral     SECTION      x3    SHOULDER ARTHROSCOPY Right 14

## 2024-12-04 NOTE — DISCHARGE INSTR - COC
Continuity of Care Form    Patient Name: Sonia Murphy   :  1965  MRN:  [id number]    Admit date:  12/3/2024  Discharge date:  ***    Code Status Order: No Order   Advance Directives:   Advance Care Flowsheet Documentation             Admitting Physician:  No admitting provider for patient encounter.  PCP: Marielos Jefferson DO    Discharging Nurse: ***  Discharging Hospital Unit/Room#: BERG F/BERG-F  Discharging Unit Phone Number: ***    Emergency Contact:   Extended Emergency Contact Information  Primary Emergency Contact: Thad Murphy  Address:  [address]  Home Phone: [phone]  Mobile Phone: [phone]  Relation: Spouse   needed? No  Secondary Emergency Contact: Gris Persaud  Home Phone: [phone]  Relation: Child   needed? No    Past Surgical History:  Past Surgical History:   Procedure Laterality Date    APPENDECTOMY      CARPAL TUNNEL RELEASE      bilateral     SECTION      x3    SHOULDER ARTHROSCOPY Right 14    SAD debridement labrum and rotator cuff, hair procedure    SINUS SURGERY      TONSILLECTOMY         Immunization History:   Immunization History   Administered Date(s) Administered    Influenza, FLUCELVAX, (age 6 mo+) IM, Trivalent PF, 0.5mL 2024       Active Problems:  Patient Active Problem List   Diagnosis Code    Disorder of bursae and tendons in shoulder region M71.9, M67.919    Post-traumatic osteoarthritis of right shoulder M19.111    Dysuria R30.0    Type 2 diabetes mellitus without complication, without long-term current use of insulin (HCC) E11.9       Isolation/Infection:   Isolation            No Isolation          Patient Infection Status       None to display            Nurse Assessment:  Last Vital Signs: BP (!) 164/76   Pulse 87   Temp 97.6 °F (36.4 °C) (Oral)   Resp 20   Wt 99.8 kg (220 lb)   LMP 2014 (Approximate)   SpO2 96%   BMI 40.24 kg/m²

## 2024-12-11 ENCOUNTER — OFFICE VISIT (OUTPATIENT)
Dept: PRIMARY CARE CLINIC | Age: 59
End: 2024-12-11
Payer: COMMERCIAL

## 2024-12-11 VITALS
OXYGEN SATURATION: 97 % | TEMPERATURE: 97.6 F | WEIGHT: 225.6 LBS | HEIGHT: 62 IN | BODY MASS INDEX: 41.51 KG/M2 | DIASTOLIC BLOOD PRESSURE: 85 MMHG | HEART RATE: 83 BPM | SYSTOLIC BLOOD PRESSURE: 133 MMHG

## 2024-12-11 DIAGNOSIS — Z76.0 MEDICATION REFILL: ICD-10-CM

## 2024-12-11 DIAGNOSIS — R06.02 SHORTNESS OF BREATH: Primary | ICD-10-CM

## 2024-12-11 LAB — NT PRO BNP: 94 PG/ML (ref 0–125)

## 2024-12-11 PROCEDURE — 99213 OFFICE O/P EST LOW 20 MIN: CPT | Performed by: FAMILY MEDICINE

## 2024-12-11 RX ORDER — FLUTICASONE PROPIONATE 44 UG/1
2 AEROSOL, METERED RESPIRATORY (INHALATION) 2 TIMES DAILY
Qty: 1 EACH | Refills: 2 | Status: SHIPPED
Start: 2024-12-11 | End: 2024-12-24

## 2024-12-11 RX ORDER — FUROSEMIDE 20 MG/1
20 TABLET ORAL DAILY
Qty: 30 TABLET | Refills: 1 | Status: SHIPPED | OUTPATIENT
Start: 2024-12-11

## 2024-12-11 NOTE — PROGRESS NOTES
Vitals: /85 (Site: Left Upper Arm)   Pulse 83   Temp 97.6 °F (36.4 °C)   Ht 1.575 m (5' 2\")   Wt 102.3 kg (225 lb 9.6 oz)   LMP 12/09/2014 (Approximate)   SpO2 97%   BMI 41.26 kg/m²   Physical Exam  Vitals and nursing note reviewed.   Constitutional:       General: She is not in acute distress.     Appearance: Normal appearance.   HENT:      Head: Normocephalic and atraumatic.   Eyes:      General:         Right eye: No discharge.         Left eye: No discharge.   Cardiovascular:      Rate and Rhythm: Normal rate and regular rhythm.      Heart sounds: No murmur heard.  Pulmonary:      Effort: Pulmonary effort is normal.      Breath sounds: Normal breath sounds. No wheezing.   Abdominal:      General: Bowel sounds are normal.      Palpations: Abdomen is soft.   Musculoskeletal:      Cervical back: No rigidity.      Right lower leg: Edema present.      Left lower leg: Edema present.   Skin:     General: Skin is warm and dry.   Neurological:      Mental Status: She is alert and oriented to person, place, and time. Mental status is at baseline.         Assessment and Plan     1. Shortness of breath  Unclear cause  Check labs, echo  Trial Lasix, Flovent  Follow-up 3 months or sooner if necessary  - Echo (TTE) complete (PRN contrast/bubble/strain/3D); Future  - fluticasone (FLOVENT HFA) 44 MCG/ACT inhaler; Inhale 2 puffs into the lungs 2 times daily  Dispense: 1 each; Refill: 2  - Brain Natriuretic Peptide  - furosemide (LASIX) 20 MG tablet; Take 1 tablet by mouth daily  Dispense: 30 tablet; Refill: 1    2. Medication refill  - tiZANidine (ZANAFLEX) 4 MG tablet; Take 1 tablet by mouth every 8 hours as needed (pain)  Dispense: 60 tablet; Refill: 0      Counseled regarding above diagnosis, including possible risks and complications, especially if left uncontrolled. Counseled regarding the possible side effects, risks, benefits and alternatives to treatment; patient and/or guardian verbalizes understanding,

## 2024-12-12 ENCOUNTER — TELEPHONE (OUTPATIENT)
Dept: CARDIOLOGY | Age: 59
End: 2024-12-12

## 2024-12-12 NOTE — TELEPHONE ENCOUNTER
CALLED PATIENT 1X AND LEFT MESSAGE TO SCHEDULE ECHO.    Electronically signed by Barb Brambila on 12/12/2024 at 8:49 AM

## 2024-12-16 ENCOUNTER — OFFICE VISIT (OUTPATIENT)
Dept: PRIMARY CARE CLINIC | Age: 59
End: 2024-12-16
Payer: COMMERCIAL

## 2024-12-16 VITALS
HEIGHT: 62 IN | TEMPERATURE: 98.4 F | DIASTOLIC BLOOD PRESSURE: 83 MMHG | OXYGEN SATURATION: 97 % | SYSTOLIC BLOOD PRESSURE: 126 MMHG | WEIGHT: 222.6 LBS | BODY MASS INDEX: 40.96 KG/M2 | HEART RATE: 71 BPM

## 2024-12-16 DIAGNOSIS — E11.9 TYPE 2 DIABETES MELLITUS WITHOUT COMPLICATION, WITHOUT LONG-TERM CURRENT USE OF INSULIN (HCC): Primary | ICD-10-CM

## 2024-12-16 DIAGNOSIS — I89.0 LYMPHEDEMA: ICD-10-CM

## 2024-12-16 LAB — HBA1C MFR BLD: 8.1 %

## 2024-12-16 PROCEDURE — 99214 OFFICE O/P EST MOD 30 MIN: CPT | Performed by: FAMILY MEDICINE

## 2024-12-16 PROCEDURE — 83036 HEMOGLOBIN GLYCOSYLATED A1C: CPT | Performed by: FAMILY MEDICINE

## 2024-12-16 PROCEDURE — 3052F HG A1C>EQUAL 8.0%<EQUAL 9.0%: CPT | Performed by: FAMILY MEDICINE

## 2024-12-16 RX ORDER — GLIMEPIRIDE 1 MG/1
1 TABLET ORAL
Qty: 30 TABLET | Refills: 2 | Status: SHIPPED | OUTPATIENT
Start: 2024-12-16

## 2024-12-16 NOTE — PROGRESS NOTES
Colorectal Cancer Screen  2030    Flu vaccine  Completed    Hepatitis C screen  Completed    Hepatitis A vaccine  Aged Out    Hepatitis B vaccine  Aged Out    Hib vaccine  Aged Out    Polio vaccine  Aged Out    Meningococcal (ACWY) vaccine  Aged Out    Diabetes screen  Discontinued       Past Medical History:      Diagnosis Date    Asthma     Foot fracture, right     Hypertension     OA (osteoarthritis) of foot     Bilateral    Superior glenoid labrum lesion 02/10/2014       Past Surgical History:        Procedure Laterality Date    APPENDECTOMY      CARPAL TUNNEL RELEASE      bilateral     SECTION      x3    SHOULDER ARTHROSCOPY Right 14    SAD debridement labrum and rotator cuff, hair procedure    SINUS SURGERY      TONSILLECTOMY         Allergies:    Neosporin [bacitracin-neomycin-polymyxin]    Social History:   Social History     Socioeconomic History    Marital status:      Spouse name: Not on file    Number of children: Not on file    Years of education: Not on file    Highest education level: Not on file   Occupational History    Not on file   Tobacco Use    Smoking status: Former     Current packs/day: 0.00     Types: Cigarettes     Quit date:      Years since quittin.9     Passive exposure: Never    Smokeless tobacco: Never   Substance and Sexual Activity    Alcohol use: No    Drug use: Never    Sexual activity: Not on file   Other Topics Concern    Not on file   Social History Narrative    Not on file     Social Determinants of Health     Financial Resource Strain: Low Risk  (5/15/2024)    Overall Financial Resource Strain (CARDIA)     Difficulty of Paying Living Expenses: Not hard at all   Food Insecurity: No Food Insecurity (5/15/2024)    Hunger Vital Sign     Worried About Running Out of Food in the Last Year: Never true     Ran Out of Food in the Last Year: Never true   Transportation Needs: Unknown (5/15/2024)    PRAPARE - Transportation     Lack of

## 2024-12-24 ENCOUNTER — TELEPHONE (OUTPATIENT)
Dept: PRIMARY CARE CLINIC | Age: 59
End: 2024-12-24

## 2024-12-24 NOTE — TELEPHONE ENCOUNTER
Patient called in, states the fluticasone inhaler does not seem to be working with her insurance.  Will try sending in Breo inhaler.

## 2024-12-30 ENCOUNTER — EVALUATION (OUTPATIENT)
Dept: OCCUPATIONAL THERAPY | Age: 59
End: 2024-12-30
Payer: COMMERCIAL

## 2024-12-30 DIAGNOSIS — I89.0 LYMPHEDEMA: Primary | ICD-10-CM

## 2024-12-30 PROCEDURE — 97165 OT EVAL LOW COMPLEX 30 MIN: CPT | Performed by: OCCUPATIONAL THERAPIST

## 2024-12-30 NOTE — PROGRESS NOTES
LE     STG: 3 weeks  1-Patient will demonstrate good knowledge and understanding for lymphedema precautions, skin care and self-management to decrease progression of lymphedema and risk of infection.  2-Patient will present with decreased limb volume in the involved extremity from moderate to min for improved indep in client centered tasks.   3-Patient will demonstrate compliance with compression therapy for independent management of lymphedema.  4-Pt will perform HEP with min assistance to reduce swelling and improve ROM in bilateral lower extremity for participation in ADLs and client centered tasks.    LT weeks  1-Patient will be independent with self-management of lymphedema including understanding garment wear schedule, self-compression bandaging, HEP and self-care.  2-Patient will be fitted for appropriate compression garments for long term management of lymphedema.  3- Patient will present with decreased limb volume in the involved extremity from min to trace  for improved indep in client centered tasks.     Past Medical History:   Past Medical History:   Diagnosis Date    Asthma     Foot fracture, right     Hypertension     OA (osteoarthritis) of foot     Bilateral    Superior glenoid labrum lesion 02/10/2014     Past Surgical History:   Past Surgical History:   Procedure Laterality Date    APPENDECTOMY      CARPAL TUNNEL RELEASE      bilateral     SECTION      x3    SHOULDER ARTHROSCOPY Right 14    SAD debridement labrum and rotator cuff, hair procedure    SINUS SURGERY      TONSILLECTOMY         []Lymph node removal: none  []Radiation Therapy: none  []Chemotherapy: none  [x]History of Cellulitis/Infection: No DVT  [x]Family history of lymphedema: mother and father-both with CA hx  [x]Previous treatment for lymphedema: Lasix with min impact, elevation, has attempted compression  [x]Current compression garment use: Can not tolerate due to pain    Reason for Referral: Pt was referred to

## 2025-01-06 ENCOUNTER — TREATMENT (OUTPATIENT)
Dept: OCCUPATIONAL THERAPY | Age: 60
End: 2025-01-06
Payer: COMMERCIAL

## 2025-01-06 DIAGNOSIS — I89.0 LYMPHEDEMA: Primary | ICD-10-CM

## 2025-01-06 PROCEDURE — 97535 SELF CARE MNGMENT TRAINING: CPT | Performed by: OCCUPATIONAL THERAPIST

## 2025-01-06 PROCEDURE — 97140 MANUAL THERAPY 1/> REGIONS: CPT | Performed by: OCCUPATIONAL THERAPIST

## 2025-01-06 NOTE — PROGRESS NOTES
OCCUPATIONAL THERAPY LYMPHEDEMA TREATMENT    [facility] OCCUPATIONAL THERAPY-[place]MPHED[place] [address]  Phone: [phone]; Fax: [phone]           Date:  2025    Initial Evaluation Date: 2024                               Evaluating Therapist: JULIA Giles/L, CLT     Patient Name:  Sonia Murphy                     :  1965     Restrictions/Precautions:  BMI 40.71, fall risk  Diagnosis:  Lymphedema (I89.0)                                                        Date of Surgery/Injury: none recently     Insurance/Certification information:    Payor: [place] BCBS [[id number]]  Plan: [place] [[id number]]  ID: [id number]  Plan of care signed (Y/N): N  Visit# / total visits: evaluation +1     Referring Practitioner/NPI#:    Marielos Jefferson DO NPI: [id number]      Specific Practitioner Orders: Evaluation and treatment; Lymphedema of bilateral lower extremities      Assessment of current deficits   []Pain  []Skin Integrity   [x]Lymphedema   []Functional transfers/mobility   []ADLs   []Strength    []Cognition  []IADLs   []Safety Awareness   []  Motor Endurance    []Fine Motor Coordination   []Balance   []Vision/perception  []Sensation []Gross Motor Coordination  []ROM      OT PLAN OF CARE   OT POC based on physician orders, patient diagnosis and results of clinical assessment     Frequency/Duration: 1-2x/week for 24 treatment sessions from 2024 through 3/24/25   Projected units:96  Approved days/units: NA on eval     Specific OT Treatment to include:      Plan of Care: 64099, 01453, 15287, 22796     [x]97140-Manual Lymph Drainage and Combined Decongestive Therapy  [x]33681- Therapeutic Exercise/ HEP including Education  [x]79111- Skin Care Education/ADLs/self-care/bandaging  [x]55532-Bnibynp Multilayer Short Stretch Compression Bandaging/ Self MLD  [x]Education for Lymphedema Risks/Precautions     [x]

## 2025-01-09 ENCOUNTER — TREATMENT (OUTPATIENT)
Dept: OCCUPATIONAL THERAPY | Age: 60
End: 2025-01-09
Payer: MEDICARE

## 2025-01-09 DIAGNOSIS — I89.0 LYMPHEDEMA: Primary | ICD-10-CM

## 2025-01-09 PROCEDURE — 97535 SELF CARE MNGMENT TRAINING: CPT | Performed by: OCCUPATIONAL THERAPIST

## 2025-01-09 PROCEDURE — 97140 MANUAL THERAPY 1/> REGIONS: CPT | Performed by: OCCUPATIONAL THERAPIST

## 2025-01-09 NOTE — PROGRESS NOTES
OCCUPATIONAL THERAPY LYMPHEDEMA TREATMENT    [facility] OCCUPATIONAL THERAPY-[place] [address]  Phone: [phone]; Fax: [phone]           Date:  2025    Initial Evaluation Date: 2024                               Evaluating Therapist: JULIA Giles/L, CLT     Patient Name:  Sonia Murphy                     :  1965     Restrictions/Precautions:  BMI 40.71, fall risk  Diagnosis:  Lymphedema (I89.0)                                                        Date of Surgery/Injury: none recently     Insurance/Certification information:    Pay[place] BCBS [[id number][place]  [place] [[id number]]  ID: [id number]  Plan of care signed (Y/N): N  Visit# / total visits: evaluation +2     Referring Practitioner/NPI#:    Marielos Jefferson DO NPI: [id number]      Specific Practitioner Orders: Evaluation and treatment; Lymphedema of bilateral lower extremities      Assessment of current deficits   []Pain  []Skin Integrity   [x]Lymphedema   []Functional transfers/mobility   []ADLs   []Strength    []Cognition  []IADLs   []Safety Awareness   []  Motor Endurance    []Fine Motor Coordination   []Balance   []Vision/perception  []Sensation []Gross Motor Coordination  []ROM      OT PLAN OF CARE   OT POC based on physician orders, patient diagnosis and results of clinical assessment     Frequency/Duration: 1-2x/week for 24 treatment sessions from 2024 through 3/24/25   Projected units:96  Approved days/units: 30 for the year     Specific OT Treatment to include:      Plan of Care: 46976, 92171, 70435, 06130     [x]97140-Manual Lymph Drainage and Combined Decongestive Therapy  [x]00351- Therapeutic Exercise/ HEP including Education  [x]04947- Skin Care Education/ADLs/self-care/bandaging  [x]10720-Khabyxb Multilayer Short Stretch Compression Bandaging/ Self MLD  [x]Education for Lymphedema Risks/Precautions

## 2025-01-13 ENCOUNTER — HOSPITAL ENCOUNTER (OUTPATIENT)
Dept: CARDIOLOGY | Age: 60
Discharge: HOME | End: 2025-01-15
Attending: FAMILY MEDICINE
Payer: MEDICARE

## 2025-01-13 VITALS — HEIGHT: 62 IN | BODY MASS INDEX: 40.85 KG/M2 | WEIGHT: 222 LBS

## 2025-01-13 DIAGNOSIS — R06.02 SHORTNESS OF BREATH: ICD-10-CM

## 2025-01-13 LAB
ECHO AO ASC DIAM: 3 CM
ECHO AO ASCENDING AORTA INDEX: 1.5 CM/M2
ECHO AV AREA PEAK VELOCITY: 1.9 CM2
ECHO AV AREA VTI: 2.3 CM2
ECHO AV AREA/BSA PEAK VELOCITY: 1 CM2/M2
ECHO AV AREA/BSA VTI: 1.2 CM2/M2
ECHO AV CUSP MM: 1.9 CM
ECHO AV MEAN GRADIENT: 4 MMHG
ECHO AV MEAN VELOCITY: 1 M/S
ECHO AV PEAK GRADIENT: 8 MMHG
ECHO AV PEAK VELOCITY: 1.4 M/S
ECHO AV VELOCITY RATIO: 0.64
ECHO AV VTI: 31.4 CM
ECHO BSA: 2.1 M2
ECHO LA DIAMETER INDEX: 2.25 CM/M2
ECHO LA DIAMETER: 4.5 CM
ECHO LA VOL A-L A2C: 65 ML (ref 22–52)
ECHO LA VOL A-L A4C: 56 ML (ref 22–52)
ECHO LA VOL MOD A2C: 61 ML (ref 22–52)
ECHO LA VOL MOD A4C: 50 ML (ref 22–52)
ECHO LA VOLUME AREA LENGTH: 61 ML
ECHO LA VOLUME INDEX A-L A2C: 33 ML/M2 (ref 16–34)
ECHO LA VOLUME INDEX A-L A4C: 28 ML/M2 (ref 16–34)
ECHO LA VOLUME INDEX AREA LENGTH: 31 ML/M2 (ref 16–34)
ECHO LA VOLUME INDEX MOD A2C: 31 ML/M2 (ref 16–34)
ECHO LA VOLUME INDEX MOD A4C: 25 ML/M2 (ref 16–34)
ECHO LV EF PHYSICIAN: 65 %
ECHO LV FRACTIONAL SHORTENING: 33 % (ref 28–44)
ECHO LV INTERNAL DIMENSION DIASTOLE INDEX: 2.25 CM/M2
ECHO LV INTERNAL DIMENSION DIASTOLIC: 4.5 CM (ref 3.9–5.3)
ECHO LV INTERNAL DIMENSION SYSTOLIC INDEX: 1.5 CM/M2
ECHO LV INTERNAL DIMENSION SYSTOLIC: 3 CM
ECHO LV ISOVOLUMETRIC RELAXATION TIME (IVRT): 100.4 MS
ECHO LV IVSD: 1.6 CM (ref 0.6–0.9)
ECHO LV IVSS: 1.9 CM
ECHO LV MASS 2D: 261.9 G (ref 67–162)
ECHO LV MASS INDEX 2D: 131 G/M2 (ref 43–95)
ECHO LV POSTERIOR WALL DIASTOLIC: 1.3 CM (ref 0.6–0.9)
ECHO LV POSTERIOR WALL SYSTOLIC: 1.5 CM
ECHO LV RELATIVE WALL THICKNESS RATIO: 0.58
ECHO LVOT AREA: 2.8 CM2
ECHO LVOT AV VTI INDEX: 0.79
ECHO LVOT DIAM: 1.9 CM
ECHO LVOT MEAN GRADIENT: 2 MMHG
ECHO LVOT PEAK GRADIENT: 3 MMHG
ECHO LVOT PEAK VELOCITY: 0.9 M/S
ECHO LVOT STROKE VOLUME INDEX: 35 ML/M2
ECHO LVOT SV: 70 ML
ECHO LVOT VTI: 24.7 CM
ECHO MV "A" WAVE DURATION: 152.3 MSEC
ECHO MV A VELOCITY: 0.96 M/S
ECHO MV AREA PHT: 2.6 CM2
ECHO MV AREA VTI: 2.3 CM2
ECHO MV E DECELERATION TIME (DT): 303 MS
ECHO MV E VELOCITY: 0.87 M/S
ECHO MV E/A RATIO: 0.91
ECHO MV LVOT VTI INDEX: 1.21
ECHO MV MAX VELOCITY: 1.1 M/S
ECHO MV MEAN GRADIENT: 2 MMHG
ECHO MV MEAN VELOCITY: 0.7 M/S
ECHO MV PEAK GRADIENT: 5 MMHG
ECHO MV PRESSURE HALF TIME (PHT): 83.1 MS
ECHO MV VTI: 29.9 CM
ECHO PV MAX VELOCITY: 0.9 M/S
ECHO PV MEAN GRADIENT: 2 MMHG
ECHO PV MEAN VELOCITY: 0.7 M/S
ECHO PV PEAK GRADIENT: 3 MMHG
ECHO PV VTI: 21.2 CM
ECHO PVEIN A DURATION: 124.6 MS
ECHO PVEIN A VELOCITY: 0.3 M/S
ECHO PVEIN PEAK D VELOCITY: 0.3 M/S
ECHO PVEIN PEAK S VELOCITY: 0.6 M/S
ECHO PVEIN S/D RATIO: 2
ECHO RV INTERNAL DIMENSION: 2.7 CM
ECHO TV REGURGITANT MAX VELOCITY: 2.72 M/S
ECHO TV REGURGITANT PEAK GRADIENT: 30 MMHG

## 2025-01-13 PROCEDURE — 93306 TTE W/DOPPLER COMPLETE: CPT | Performed by: INTERNAL MEDICINE

## 2025-01-13 PROCEDURE — 93306 TTE W/DOPPLER COMPLETE: CPT

## 2025-01-14 ENCOUNTER — TREATMENT (OUTPATIENT)
Dept: OCCUPATIONAL THERAPY | Age: 60
End: 2025-01-14
Payer: MEDICARE

## 2025-01-14 DIAGNOSIS — I89.0 LYMPHEDEMA: Primary | ICD-10-CM

## 2025-01-14 PROCEDURE — 97140 MANUAL THERAPY 1/> REGIONS: CPT | Performed by: OCCUPATIONAL THERAPIST

## 2025-01-14 PROCEDURE — 97110 THERAPEUTIC EXERCISES: CPT | Performed by: OCCUPATIONAL THERAPIST

## 2025-01-14 PROCEDURE — 97535 SELF CARE MNGMENT TRAINING: CPT | Performed by: OCCUPATIONAL THERAPIST

## 2025-01-14 NOTE — PROGRESS NOTES
OCCUPATIONAL THERAPY LYMPHEDEMA TREATMENT    [facility] OCCUPATIONAL THERAPY[place] [address]  Phone: [phone]; Fax: [phone]           Date:  2025    Initial Evaluation Date: 2024                               Evaluating Therapist: JULIA Giles/L, CLT     Patient Name:  Sonia Murphy                     :  1965     Restrictions/Precautions:  BMI 40.71, fall risk  Diagnosis:  Lymphedema (I89.0)                                                        Date of Surgery/Injury: none recently     Insurance/Certification information:    [place] [[id number][place]  [place] [[id number]]  ID: [id number]  Plan of care signed (Y/N): No-sent 24  Visit# / total visits: evaluation +3     Referring Practitioner/NPI#:    Marielos Jefferson DO NPI: [id number]      Specific Practitioner Orders: Evaluation and treatment; Lymphedema of bilateral lower extremities      Assessment of current deficits   []Pain  []Skin Integrity   [x]Lymphedema   []Functional transfers/mobility   []ADLs   []Strength    []Cognition  []IADLs   []Safety Awareness   []  Motor Endurance    []Fine Motor Coordination   []Balance   []Vision/perception  []Sensation []Gross Motor Coordination  []ROM      OT PLAN OF CARE   OT POC based on physician orders, patient diagnosis and results of clinical assessment     Frequency/Duration: 1-2x/week for 24 treatment sessions from 2024 through 3/24/25   Projected units:96  Approved days/units: 30 for the year     Specific OT Treatment to include:      Plan of Care: 57769, 81743, 29065, 33883     [x]97140-Manual Lymph Drainage and Combined Decongestive Therapy  [x]64242- Therapeutic Exercise/ HEP including Education  [x]16024- Skin Care Education/ADLs/self-care/bandaging  [x]03063-Zklprdc Multilayer Short Stretch Compression Bandaging/ Self MLD  [x]Education for Lymphedema

## 2025-01-16 ENCOUNTER — TREATMENT (OUTPATIENT)
Dept: OCCUPATIONAL THERAPY | Age: 60
End: 2025-01-16
Payer: MEDICARE

## 2025-01-16 DIAGNOSIS — I89.0 LYMPHEDEMA: Primary | ICD-10-CM

## 2025-01-16 PROCEDURE — 97110 THERAPEUTIC EXERCISES: CPT | Performed by: OCCUPATIONAL THERAPIST

## 2025-01-16 PROCEDURE — 97535 SELF CARE MNGMENT TRAINING: CPT | Performed by: OCCUPATIONAL THERAPIST

## 2025-01-16 PROCEDURE — 97140 MANUAL THERAPY 1/> REGIONS: CPT | Performed by: OCCUPATIONAL THERAPIST

## 2025-01-16 NOTE — PROGRESS NOTES
hip abduction/adduction, and straight leg raises . Pt with fair understanding. Educated pt that if time allowed for only MLD or exercises, to complete MLD first. To continue addressing. (10 min 60789)    LT weeks  1-Patient will be independent with self-management of lymphedema including understanding garment wear schedule, self-compression bandaging, HEP and self-care.  2-Patient will be fitted for appropriate compression garments for long term management of lymphedema.  3- Patient will present with decreased limb volume in the involved extremity from min to trace  for improved indep in client centered tasks.      Restrictions/Precautions:  [] No blood pressure/blood draw in: [] Left Upper Extremity     [] Right Upper Extremity        [x] Fall Risk       Intervention:   []Other    Pain:  [] No    [x] Yes  Location:feet/legs bilateral   Pain Rating (0-10 pain scale): min  Pain Description: aching, heaviness, squeezing, tight (pulling), uncomfortable, and variable intensity   Interruption of Treatment [] Yes  [x] No    Came to Clinic:  [] Bandaged    []Unbandaged    [] With Stocking     [] With Sleeve     [] Kinesiotaped    [] Unna Boot    [x] With Alternative Compression: TG to bilateral LE    Skin Integrity:  [] Normal [x] Dry  []Rough []Moist []Rash  Location of problem area/s:  [x]Hyperplasia   [x]Hyperkeratosis  [x]Hyperpigmentation  []Papillomas  []Lymphorrhea  [x]Lipodermatosclerosis   [] Other    Color:  [] Normal [] Mottled [x] Flushed [] Other/Description  Location of problem area/s:    Edema:  Edema Location: Right LE from knee down  [] 1+ Edema [x] 2+ Edema  [] 3+ Edema [] 4+ Edema  Edema Location: Left LE from knee down  [] 1+ Edema [x] 2+ Edema  [] 3+ Edema [] 4+ Edema  Edema Location: bilateral feet and thighs  [] 1+ Edema [x] 2+ Edema  [] 3+ Edema [] 4+ Edema    Subjective: Pt reported a noted difference following last session. She continues to have some anxiety around compression garments, but

## 2025-01-20 ENCOUNTER — TELEPHONE (OUTPATIENT)
Dept: OCCUPATIONAL THERAPY | Age: 60
End: 2025-01-20

## 2025-01-20 NOTE — TELEPHONE ENCOUNTER
[facility] OCCUPATIONAL THERAPY   [address]  [place] [address]  Dept: [phone]  Loc: [phone]   [facility] [place] OT Fax: [phone]    Cancellation/No Show Note      Date:  2025    Patient Name:  Sonia Murphy     :  1965         PT ID: [id number]    Total missed visits including today: 1       Total number of no shows: 0    For today's appointment patient:   [x]  Cancelled   []  Rescheduled appointment   []  No-show     Reason given by patient:   []  Patient ill   []  Conflicting appointment   []  No transportation   []  Conflict with work   [x]  No reason given   []  Other:    Comments:                    Comments:  Scheduled for later this week.     Electronically signed by:  Kalee Durant OT,

## 2025-01-23 ENCOUNTER — TREATMENT (OUTPATIENT)
Dept: OCCUPATIONAL THERAPY | Age: 60
End: 2025-01-23

## 2025-01-23 DIAGNOSIS — I89.0 LYMPHEDEMA: Primary | ICD-10-CM

## 2025-01-23 NOTE — PROGRESS NOTES
OCCUPATIONAL THERAPY LYMPHEDEMA TREATMENT    [facility] OCCUPATIONAL THERAPY-[place]MPHED[place] [address]  Phone: [phone]; Fax: [phone]       Date:  2025    Initial Evaluation Date: 2024                               Evaluating Therapist: JULIA Giles/L, CLT     Patient Name:  Sonia Murphy                     :  1965     Restrictions/Precautions:  BMI 40.71, fall risk  Diagnosis:  Lymphedema (I89.0)                                                        Date of Surgery/Injury: none recently     Insurance/Certification information:    Pay[place] [[id number][place]  [place] [[id number][place]  ID: [id number]  Plan of care signed (Y/N): No-sent 24 and 25  Visit# / total visits: evaluation + 5     Referring Practitioner/NPI#:    Marielos Jefferson DO NPI: [id number]      Specific Practitioner Orders: Evaluation and treatment; Lymphedema of bilateral lower extremities      Assessment of current deficits   []Pain  []Skin Integrity   [x]Lymphedema   []Functional transfers/mobility   []ADLs   []Strength    []Cognition  []IADLs   []Safety Awareness   []  Motor Endurance    []Fine Motor Coordination   []Balance   []Vision/perception  []Sensation []Gross Motor Coordination  []ROM      OT PLAN OF CARE   OT POC based on physician orders, patient diagnosis and results of clinical assessment     Frequency/Duration: 1-2x/week for 24 treatment sessions from 2024 through 3/24/25   Projected units:96  Approved days/units: 30 for the year     Specific OT Treatment to include:      Plan of Care: 41054, 85554, 38000, 59310     [x]97140-Manual Lymph Drainage and Combined Decongestive Therapy  [x]18282- Therapeutic Exercise/ HEP including Education  [x]81067- Skin Care Education/ADLs/self-care/bandaging  [x]02436-Cftlmes Multilayer Short Stretch Compression Bandaging/ Self MLD  [x]Education for

## 2025-01-30 ENCOUNTER — TREATMENT (OUTPATIENT)
Dept: OCCUPATIONAL THERAPY | Age: 60
End: 2025-01-30

## 2025-01-30 DIAGNOSIS — I89.0 LYMPHEDEMA: Primary | ICD-10-CM

## 2025-01-30 NOTE — PROGRESS NOTES
OCCUPATIONAL THERAPY LYMPHEDEMA UPDATE    [facility] OCCUPATIONAL THERAPY-[place]MPHED[place] [address]  Phone: [phone]; Fax: [phone]       Date:  2025    Initial Evaluation Date: 2024                               Evaluating Therapist: JULIA Giles/L, CLT     Patient Name:  Sonia Murphy                     :  1965     Restrictions/Precautions:  BMI 40.71, fall risk  Diagnosis:  Lymphedema (I89.0)                                                        Date of Surgery/Injury: none recently     Insurance/Certification information:    Pay[place] [[id number][place]  [place] [[id number]]  ID: [id number]  Plan of care signed (Y/N): yes 25-sent 24 and 25  Visit# / total visits: evaluation + 6  Cancels/No-shows to date: 1    Current update duration from 25 to 25  Last seen by therapist:  TODAY     Patient reaction to treatment:  Despite regular and compliant use of conservative treatments for 4+ weeks, including exercise, MLD, elevation and compression (SHORT STRETCH), she continues to present with lymphedema. Patient has a good understanding of skin care management and the affects of sodium/diet on their lymphedema. Pt has physical limitations due to PMH. A good candidate for device at this time. Measurements listed below.    Garment / DME recommended:  Pt currently wearing short stretch bandages, but measured and fitted for garments this session. Medi Plus: calf high; size 6; standard length; 20-30mmHg; open toe  Referring Practitioner/NPI#:    Marielos Jefferson DO NPI: [id number]      Specific Practitioner Orders: Evaluation and treatment; Lymphedema of bilateral lower extremities      Assessment of current deficits   []Pain  []Skin Integrity   [x]Lymphedema   []Functional transfers/mobility   []ADLs   []Strength    []Cognition  []IADLs   []Safety Awareness   []  Motor

## 2025-02-02 DIAGNOSIS — R06.02 SHORTNESS OF BREATH: ICD-10-CM

## 2025-02-03 RX ORDER — FUROSEMIDE 20 MG/1
20 TABLET ORAL DAILY
Qty: 30 TABLET | Refills: 5 | Status: SHIPPED | OUTPATIENT
Start: 2025-02-03

## 2025-02-03 NOTE — TELEPHONE ENCOUNTER
Name of Medication(s) Requested:  Requested Prescriptions     Pending Prescriptions Disp Refills    furosemide (LASIX) 20 MG tablet [Pharmacy Med Name: Furosemide 20 MG Oral Tablet] 30 tablet 0     Sig: Take 1 tablet by mouth once daily       Medication is on current medication list Yes    Dosage and directions were verified? Yes    Quantity verified: 90 day supply     Pharmacy Verified?  Yes    Last Appointment:  12/16/2024    Future appts:  Future Appointments   Date Time Provider Department Center   2/4/2025 11:00 AM Kalee Durant OT [place] [facility]   2/6/2025 11:00 AM Kalee Durant OT [place] [facility]   3/17/2025  9:15 AM Marielos Jefferson,  [facility]        (If no appt send self scheduling link. .REFILLAPPT)  Scheduling request sent?     [] Yes  [x] No    Does patient need updated?  [] Yes  [x] No

## 2025-02-04 ENCOUNTER — TREATMENT (OUTPATIENT)
Dept: OCCUPATIONAL THERAPY | Age: 60
End: 2025-02-04
Payer: MEDICARE

## 2025-02-04 DIAGNOSIS — I89.0 LYMPHEDEMA: Primary | ICD-10-CM

## 2025-02-04 PROCEDURE — 97140 MANUAL THERAPY 1/> REGIONS: CPT | Performed by: OCCUPATIONAL THERAPIST

## 2025-02-04 PROCEDURE — 97535 SELF CARE MNGMENT TRAINING: CPT | Performed by: OCCUPATIONAL THERAPIST

## 2025-02-04 PROCEDURE — 97110 THERAPEUTIC EXERCISES: CPT | Performed by: OCCUPATIONAL THERAPIST

## 2025-02-04 NOTE — PROGRESS NOTES
OCCUPATIONAL THERAPY LYMPHEDEMA TREATMENT NOTE    [facility] OCCUPATIONAL THERAPY[place] [address]  Phone: [phone]; Fax: [phone]       Date:  2025    Initial Evaluation Date: 2024                               Evaluating Therapist: JULIA Giles/L, CLT     Patient Name:  Sonia Murphy                     :  1965     Restrictions/Precautions:  BMI 40.71, fall risk  Diagnosis:  Lymphedema (I89.0)                                                        Date of Surgery/Injury: none recently     Insurance/Certification information:    [place] [[id number][place]  [place] [[id number]]  ID: [id number]  Plan of care signed (Y/N): yes 25-sent 24 and 25  Visit# / total visits: evaluation + 7  Cancels/No-shows to date: 1    Current update duration from 25 to 2025    Last seen by therapist:  TODAY     Patient reaction to treatment:  Despite regular and compliant use of conservative treatments for 4+ weeks, including exercise, MLD, elevation and compression (SHORT STRETCH), she continues to present with lymphedema. Patient has a good understanding of skin care management and the affects of sodium/diet on their lymphedema. Pt has physical limitations due to PMH. A good candidate for device at this time. Measurements listed below.    Garment / DME recommended:  Pt currently wearing short stretch bandages, but measured and fitted for garments. Medi Plus: calf high; size 6; standard length; 20-30mmHg; open toe  Referring Practitioner/NPI#:    Marielos Jefferson DO NPI: [id number]      Specific Practitioner Orders: Evaluation and treatment; Lymphedema of bilateral lower extremities      Assessment of current deficits   []Pain  []Skin Integrity   [x]Lymphedema   []Functional transfers/mobility   []ADLs   []Strength    []Cognition  []IADLs   []Safety Awareness   []  Motor

## 2025-02-06 ENCOUNTER — TREATMENT (OUTPATIENT)
Dept: OCCUPATIONAL THERAPY | Age: 60
End: 2025-02-06

## 2025-02-06 DIAGNOSIS — I89.0 LYMPHEDEMA: Primary | ICD-10-CM

## 2025-02-06 NOTE — PROGRESS NOTES
42.5         45  42           44cm 40  40         41.5  39.75           48cm 43  41.75         43.5  41.5           52cm 45.5  45         47  46.5           56cm    49            47           60cm    52            50.5           64cm    55            55.5           68cm    56                       72cm                           76cm                           80cm                           84cm                              Measurements are made at center of each toe and are circumferential    Eval  Left   12/30/2024  pre pneumatic compression          Follow up #1  1/30/25pre pneumatic compression Follow up #2 Follow up #3 Follow up #4 Follow up #5 Eval   Right  12/30/2024  pre pneumatic compression           Follow up #1  1/30/25pre pneumatic compression Follow up #2 Follow up #3 Follow up #4 Follow up #5   First toe 8.75  8         9  8           Second toe 5.75  5.5         6  5.25           Third toe 5  5         5.25  5           Fourth toe 5  4.75         5  4.75           Fifth toe 5.5  5         5.25  5              Leg Length:   Knee-42     Functional Outcome Measurements:  -Lymphedema Life Impact Scale Score:  46  Percent Impairment:  64%     -Functional Assessment of Chronic Illness Therapy Fatigue Scale Version 4:22/52; severe fatigue    Assessment:  Knowledge of home program:   [] Good [x] Fair+  [] Poor  Patient is programming at home:             [x] Yes  [] No  Family is assisting with programming: [] Yes  [x] No  Home programming is consistent:             [x] Yes  [] No  Other:   Response to treatment: Less swelling at end of session with pt reporting subjective improvement overall. More optimistic and less overwhelmed with management. Pt to attempt on her own with MLD and compression and return next week to assess compliance and improvement.   Instructions for patient:   [x] Verbal [x] Written  Instructions addressed:  [] Measurement [x]Manual Lymph Drainage  []Bandaging   []Kinesiotaping [x]

## 2025-02-10 ENCOUNTER — TREATMENT (OUTPATIENT)
Dept: OCCUPATIONAL THERAPY | Age: 60
End: 2025-02-10
Payer: MEDICARE

## 2025-02-10 DIAGNOSIS — I89.0 LYMPHEDEMA: Primary | ICD-10-CM

## 2025-02-10 PROCEDURE — 97140 MANUAL THERAPY 1/> REGIONS: CPT | Performed by: OCCUPATIONAL THERAPIST

## 2025-02-10 PROCEDURE — 97535 SELF CARE MNGMENT TRAINING: CPT | Performed by: OCCUPATIONAL THERAPIST

## 2025-02-10 PROCEDURE — 97110 THERAPEUTIC EXERCISES: CPT | Performed by: OCCUPATIONAL THERAPIST

## 2025-02-10 NOTE — PROGRESS NOTES
OCCUPATIONAL THERAPY LYMPHEDEMA TREATMENT NOTE    [facility] PHYSICIANS [facility] OCCUPATIONAL THERAPY[place] [address]  Phone: [phone]; Fax: [phone]       Date:  2/10/2025    Initial Evaluation Date: 2024                               Evaluating Therapist: JULIA Giles/L, CLT     Patient Name:  Sonia Murphy                     :  1965     Restrictions/Precautions:  BMI 40.71, fall risk  Diagnosis:  Lymphedema (I89.0)                                                        Date of Surgery/Injury: none recently     Insurance/Certification information:    Pay[place]: [place] BCBS [[id number]]  Plan: [place] [[id number]]  ID: [id number]  Plan of care signed (Y/N): yes 25-sent 24 and 25  Visit# / total visits: evaluation + 9  Cancels/No-shows to date: 1    Current update duration from 25 to 2/10/2025    Last seen by therapist:  TODAY     Patient reaction to treatment:  Despite regular and compliant use of conservative treatments for 4+ weeks, including exercise, MLD, elevation and compression (SHORT STRETCH), she continues to present with lymphedema. Patient has a good understanding of skin care management and the affects of sodium/diet on their lymphedema. Pt has physical limitations due to PMH. A good candidate for device at this time. Measurements listed below.    Garment / DME recommended:  Pt currently wearing short stretch bandages, but measured and fitted for garments. Medi Plus: calf high; size 6; standard length; 20-30mmHg; open toe  Referring Practitioner/NPI#:    Marielos Jefferson DO NPI: [id number]      Specific Practitioner Orders: Evaluation and treatment; Lymphedema of bilateral lower extremities      Assessment of current deficits   []Pain  []Skin Integrity   [x]Lymphedema   []Functional transfers/mobility   []ADLs   []Strength    []Cognition  []IADLs   []Safety Awareness   []  Motor

## 2025-02-12 ENCOUNTER — TREATMENT (OUTPATIENT)
Dept: OCCUPATIONAL THERAPY | Age: 60
End: 2025-02-12

## 2025-02-12 DIAGNOSIS — I89.0 LYMPHEDEMA: Primary | ICD-10-CM

## 2025-02-12 NOTE — PROGRESS NOTES
OCCUPATIONAL THERAPY LYMPHEDEMA TREATMENT NOTE    [facility] PHYSICIANS [facility] OCCUPATIONAL THERAPY-[place]MPHED[place] [address]  Phone: [phone]; Fax: [phone]       Date:  2025    Initial Evaluation Date: 2024                               Evaluating Therapist: JULIA Giles/L, CLT     Patient Name:  Sonia Murphy                     :  1965     Restrictions/Precautions:  BMI 40.71, fall risk  Diagnosis:  Lymphedema (I89.0)                                                        Date of Surgery/Injury: none recently     Insurance/Certification information:    Pay[place] BC[place] [[id number][place]  Plan[place] [[id number]]  ID: [id number]  Plan of care signed (Y/N): yes 25-sent 24 and 25  Visit# / total visits: evaluation + 10  Cancels/No-shows to date: 1    Current update duration from 25 to 2025    Last seen by therapist:  TODAY     Patient reaction to treatment:  Despite regular and compliant use of conservative treatments for 4+ weeks, including exercise, MLD, elevation and compression (SHORT STRETCH), she continues to present with lymphedema. Patient has a good understanding of skin care management and the affects of sodium/diet on their lymphedema. Pt has physical limitations due to PMH. A good candidate for device at this time. Measurements listed below.    Garment / DME recommended:  Pt currently wearing short stretch bandages, but measured and fitted for garments. Medi Plus: calf high; size 6; standard length; 20-30mmHg; open toe-wearing daily  Referring Practitioner/NPI#:    Marielos Jefferson DO NPI: [id number]      Specific Practitioner Orders: Evaluation and treatment; Lymphedema of bilateral lower extremities      Assessment of current deficits   []Pain  []Skin Integrity   [x]Lymphedema   []Functional transfers/mobility   []ADLs   []Strength    []Cognition  []IADLs   []Safety Awareness

## 2025-02-20 ENCOUNTER — TREATMENT (OUTPATIENT)
Dept: OCCUPATIONAL THERAPY | Age: 60
End: 2025-02-20

## 2025-02-20 DIAGNOSIS — I89.0 LYMPHEDEMA: Primary | ICD-10-CM

## 2025-02-20 NOTE — PROGRESS NOTES
OCCUPATIONAL THERAPY LYMPHEDEMA TREATMENT NOTE    [facility] PHYSICIANS [facility] OCCUPATIONAL THERAPY-[place]MPHED[place] [address]  Phone: [phone]; Fax: [phone]       Date:  2025    Initial Evaluation Date: 2024                               Evaluating Therapist: JULIA Giles/L, CLT     Patient Name:  Sonia Murphy                     :  1965     Restrictions/Precautions:  BMI 40.71, fall risk  Diagnosis:  Lymphedema (I89.0)                                                        Date of Surgery/Injury: none recently     Insurance/Certification information:    [place][id number][place]  [place] [[id number]]  ID: [id number]  Plan of care signed (Y/N): yes 25-sent 24 and 25  Visit# / total visits: evaluation + 11  Cancels/No-shows to date: 1    Current update duration from 25 to 2025    Last seen by therapist:  TODAY     Patient reaction to treatment:  Despite regular and compliant use of conservative treatments for 4+ weeks, including exercise, MLD, elevation and compression (SHORT STRETCH), she continues to present with lymphedema. Patient has a good understanding of skin care management and the affects of sodium/diet on their lymphedema. Pt has physical limitations due to PMH. A good candidate for device at this time. Measurements listed below.    Garment / DME recommended:  Pt currently wearing short stretch bandages, but measured and fitted for garments. Medi Plus: calf high; size 6; standard length; 20-30mmHg; open toe-wearing daily  Referring Practitioner/NPI#:    Marielos Jefferson DO NPI: [id number]      Specific Practitioner Orders: Evaluation and treatment; Lymphedema of bilateral lower extremities      Assessment of current deficits   []Pain  []Skin Integrity   [x]Lymphedema   []Functional transfers/mobility   []ADLs   []Strength    []Cognition  []IADLs   []Safety Awareness

## 2025-02-21 DIAGNOSIS — E11.9 TYPE 2 DIABETES MELLITUS WITHOUT COMPLICATION, WITHOUT LONG-TERM CURRENT USE OF INSULIN (HCC): ICD-10-CM

## 2025-02-21 RX ORDER — GLIMEPIRIDE 1 MG/1
1 TABLET ORAL
Qty: 90 TABLET | Refills: 1 | Status: SHIPPED | OUTPATIENT
Start: 2025-02-21

## 2025-02-21 NOTE — TELEPHONE ENCOUNTER
Name of Medication(s) Requested:  Requested Prescriptions     Pending Prescriptions Disp Refills    glimepiride (AMARYL) 1 MG tablet 90 tablet 1     Sig: Take 1 tablet by mouth every morning (before breakfast)       Medication is on current medication list Yes    Dosage and directions were verified? Yes    Quantity verified: 90 day supply     Pharmacy Verified?  Yes    Last Appointment:  12/16/2024    Future appts:  Future Appointments   Date Time Provider Department Center   2/27/2025 11:00 AM Kalee Durant OT [place] [facility]   3/17/2025  9:15 AM Marielos Jefferson, DO [facility] ECC DEP        (If no appt send self scheduling link. .REFILLAPPT)  Scheduling request sent?     [] Yes  [x] No    Does patient need updated?  [] Yes  [x] No

## 2025-02-27 ENCOUNTER — TREATMENT (OUTPATIENT)
Dept: OCCUPATIONAL THERAPY | Age: 60
End: 2025-02-27

## 2025-02-27 DIAGNOSIS — I89.0 LYMPHEDEMA: Primary | ICD-10-CM

## 2025-02-27 NOTE — PROGRESS NOTES
OCCUPATIONAL THERAPY LYMPHEDEMA TREATMENT NOTE    [facility] PHYSICIANS [facility] OCCUPATIONAL THERAPY-[place]MPHED[place] [address]  Phone: [phone]; Fax: [phone]       Date:  2025    Initial Evaluation Date: 2024                               Evaluating Therapist: Kalee Durant, JULIA/L, CLT     Patient Name:  Sonia Murphy                     :  1965     Restrictions/Precautions:  BMI 40.71, fall risk  Diagnosis:  Lymphedema (I89.0)                                                        Date of Surgery/Injury: none recently     Insurance/Certification information:    [place][id number][place]  [place] [[id number][place]  ID: [id number]  Plan of care signed (Y/N): yes 25-sent 24 and 25  Visit# / total visits: evaluation + 12  Cancels/No-shows to date: 1    Current update duration from 25 to 2025    Last seen by therapist:  TODAY     Patient reaction to treatment:  Despite regular and compliant use of conservative treatments for 8+ weeks, including exercise, MLD, elevation and compression (SHORT STRETCH), she continues to present with lymphedema. Patient has a good understanding of skin care management and the affects of sodium/diet on their lymphedema. Pt has physical limitations due to PMH. A good candidate for device at this time. Measurements listed below.    Garment / DME recommended:  Medi Plus: calf high; size 6; standard length; 20-30mmHg; open toe-wearing daily-indep to don.   Referring Practitioner/NPI#:    Marielos Jefferson DO NPI: [id number]      Specific Practitioner Orders: Evaluation and treatment; Lymphedema of bilateral lower extremities      Assessment of current deficits   []Pain  []Skin Integrity   [x]Lymphedema   []Functional transfers/mobility   []ADLs   []Strength    []Cognition  []IADLs   []Safety Awareness   []  Motor Endurance    []Fine Motor Coordination   []Balance

## 2025-02-28 DIAGNOSIS — E11.9 TYPE 2 DIABETES MELLITUS WITHOUT COMPLICATION, WITHOUT LONG-TERM CURRENT USE OF INSULIN (HCC): ICD-10-CM

## 2025-02-28 RX ORDER — METFORMIN HYDROCHLORIDE 500 MG/1
TABLET, EXTENDED RELEASE ORAL
Qty: 180 TABLET | Refills: 0 | OUTPATIENT
Start: 2025-02-28

## 2025-03-06 ENCOUNTER — TREATMENT (OUTPATIENT)
Dept: OCCUPATIONAL THERAPY | Age: 60
End: 2025-03-06

## 2025-03-06 DIAGNOSIS — I89.0 LYMPHEDEMA: Primary | ICD-10-CM

## 2025-03-06 NOTE — PROGRESS NOTES
Massage   []Self Bandaging [x] Exercise    []Wound Care  [x]Hygiene  [x] Other    Rehab Potential: [x] Excellent [] Good [] Fair  [] Poor     Goal Status:  [] Achieved [x] Partially Achieved  [] Not Achieved     Patient Status: [] Patient now discharged    Time In: 1100            Time Out: 1200                      Timed Code Treatment Minutes: 60 minutes    CODE  Minutes  Units   71998 OT Eval Low     51751 OT Eval Medium     64715 OT Eval High     93404 Fluidotherapy     89331 Manual 50 3   99940 Therapeutic Ex     11721 Therapeutic Activity     88264 ADL/COMP Tech Train 10 1   76789 Neuromuscular Re-Ed     39873 OrthoManagementTraining     15313 Paraffin     81142 Electrical Stim - Attended     99160 Iontophoresis     97421 Ultrasound      Other     Total  60 4       Plan: Continue to address:  [x]Bandaging  [x] Self Bandaging/Family Assist  [x]MLD  [x]Self Massage  [x]Exercise  [x]Education  [x]Obtain referral for garments  []Medical Hold  []Discharge to Home Program  Kalee Durant OTR/L CLT

## 2025-03-19 NOTE — PROGRESS NOTES
indep in client centered tasks.   3/20/2025: Reviewed/completed MLD sequence to the bilateral LE including cervical/shoulder ROM and abdominal sequence to improve tissue extensibility; increase range of motion; induce relaxation; mobilize or manipulate soft tissue and joints; modulate pain; and reduce edema for carry over into self care and client centered tasks. Pt with fair+ understanding and recommending that they continue to trial on their own daily at this point. Has been completing daily with success, but fatigue. Trial and education on pneumatic compression to right LE at 60mmHg for 40min, suspecting that she will need device for long term use. Mod swelling overall to bilateral LE, including swelling at the ankles due to her inability to reach this areas independently with MLD.  Less swelling at end of session for min overall. Awaiting pump and recommended due to increased size since dropping from 2x per week to 1 per week to assess indep with home program. Measurements listed below. To continue addressing as progressing towards goal. (60min 19454)       3-Patient will demonstrate compliance with compression therapy for independent management of lymphedema.  3/6/25 Continued compression education for long term management and prevention of fluid build up. Pt arrived to session with new compression garments and educated on use and wear recommendations. Indep to don this session and recommending that she continues applying daily. Provided with AE for application. Medi Plus: calf high; size 6; standard length; 20-30mmHg; open toe. She was able to tolerate for about 8+ hours with last application. Goal met to highest indep. (0min 58428)   4-Pt will perform HEP with min assistance to reduce swelling and improve ROM in bilateral lower extremity for participation in ADLs and client centered tasks.  2/20/25: Pt completed cervical and shoulder ROM exercises 10x each plan with focus on body mechanics to promote

## 2025-03-20 ENCOUNTER — TREATMENT (OUTPATIENT)
Dept: OCCUPATIONAL THERAPY | Age: 60
End: 2025-03-20

## 2025-03-20 DIAGNOSIS — I89.0 LYMPHEDEMA: Primary | ICD-10-CM

## 2025-03-30 SDOH — ECONOMIC STABILITY: INCOME INSECURITY: IN THE LAST 12 MONTHS, WAS THERE A TIME WHEN YOU WERE NOT ABLE TO PAY THE MORTGAGE OR RENT ON TIME?: NO

## 2025-03-30 SDOH — ECONOMIC STABILITY: FOOD INSECURITY: WITHIN THE PAST 12 MONTHS, YOU WORRIED THAT YOUR FOOD WOULD RUN OUT BEFORE YOU GOT MONEY TO BUY MORE.: NEVER TRUE

## 2025-03-30 SDOH — ECONOMIC STABILITY: TRANSPORTATION INSECURITY
IN THE PAST 12 MONTHS, HAS LACK OF TRANSPORTATION KEPT YOU FROM MEETINGS, WORK, OR FROM GETTING THINGS NEEDED FOR DAILY LIVING?: NO

## 2025-03-30 SDOH — ECONOMIC STABILITY: FOOD INSECURITY: WITHIN THE PAST 12 MONTHS, THE FOOD YOU BOUGHT JUST DIDN'T LAST AND YOU DIDN'T HAVE MONEY TO GET MORE.: NEVER TRUE

## 2025-04-01 ENCOUNTER — OFFICE VISIT (OUTPATIENT)
Dept: PRIMARY CARE CLINIC | Age: 60
End: 2025-04-01
Payer: MEDICARE

## 2025-04-01 VITALS
WEIGHT: 233 LBS | DIASTOLIC BLOOD PRESSURE: 78 MMHG | BODY MASS INDEX: 42.88 KG/M2 | HEIGHT: 62 IN | SYSTOLIC BLOOD PRESSURE: 135 MMHG | HEART RATE: 78 BPM | TEMPERATURE: 97.7 F | OXYGEN SATURATION: 99 % | RESPIRATION RATE: 18 BRPM

## 2025-04-01 DIAGNOSIS — Z00.00 MEDICARE ANNUAL WELLNESS VISIT, SUBSEQUENT: Primary | ICD-10-CM

## 2025-04-01 DIAGNOSIS — J45.40 MODERATE PERSISTENT ASTHMA WITHOUT COMPLICATION: ICD-10-CM

## 2025-04-01 DIAGNOSIS — E11.9 TYPE 2 DIABETES MELLITUS WITHOUT COMPLICATION, WITHOUT LONG-TERM CURRENT USE OF INSULIN: ICD-10-CM

## 2025-04-01 PROCEDURE — 83036 HEMOGLOBIN GLYCOSYLATED A1C: CPT | Performed by: FAMILY MEDICINE

## 2025-04-01 PROCEDURE — G0439 PPPS, SUBSEQ VISIT: HCPCS | Performed by: FAMILY MEDICINE

## 2025-04-01 RX ORDER — GLIMEPIRIDE 2 MG/1
2 TABLET ORAL
Qty: 90 TABLET | Refills: 1 | Status: SHIPPED | OUTPATIENT
Start: 2025-04-01

## 2025-04-01 RX ORDER — BUDESONIDE 0.25 MG/2ML
250 INHALANT ORAL 2 TIMES DAILY
Qty: 60 EACH | Refills: 2 | Status: SHIPPED | OUTPATIENT
Start: 2025-04-01

## 2025-04-01 ASSESSMENT — LIFESTYLE VARIABLES
HOW MANY STANDARD DRINKS CONTAINING ALCOHOL DO YOU HAVE ON A TYPICAL DAY: PATIENT DOES NOT DRINK
HOW OFTEN DO YOU HAVE A DRINK CONTAINING ALCOHOL: NEVER

## 2025-04-01 ASSESSMENT — PATIENT HEALTH QUESTIONNAIRE - PHQ9
SUM OF ALL RESPONSES TO PHQ QUESTIONS 1-9: 0
SUM OF ALL RESPONSES TO PHQ QUESTIONS 1-9: 0
2. FEELING DOWN, DEPRESSED OR HOPELESS: NOT AT ALL
1. LITTLE INTEREST OR PLEASURE IN DOING THINGS: NOT AT ALL
SUM OF ALL RESPONSES TO PHQ QUESTIONS 1-9: 0
SUM OF ALL RESPONSES TO PHQ QUESTIONS 1-9: 0

## 2025-04-01 NOTE — PROGRESS NOTES
Medicare Annual Wellness Visit    Sonia Murphy is here for Follow-up, Diabetes (Tet results heart), and Medicare AWV    Assessment & Plan   Medicare annual wellness visit, subsequent  -     Full code  Type 2 diabetes mellitus without complication, without long-term current use of insulin  -     POCT glycosylated hemoglobin (Hb A1C)  -     Agustín Singletary MD, Bariatrics, [facility]  -     glimepiride (AMARYL) 2 MG tablet; Take 1 tablet by mouth every morning (before breakfast), Disp-90 tablet, R-1Normal  Moderate persistent asthma without complication  -     budesonide (PULMICORT) 0.25 MG/2ML nebulizer suspension; Take 2 mLs by nebulization 2 times daily, Disp-60 each, R-2Normal  -     DME Order for Nebulizer as OP  BMI 40.0-44.9, adult  -     Agustín Singletary MD, Bariatrics, [facility]       Return in 6 weeks (on 5/13/2025) for 6 weeks FU breathing, Medicare Annual Wellness Visit in 1 year.     Subjective   The following acute and/or chronic problems were also addressed today:  Diabetes Mellitus Type 2  Hemoglobin A1C   Date Value Ref Range Status   12/16/2024 8.1 % Final   POCT A1C today 7.8  Medications include metformin 1000 mg daily, glimepiride 1 mg daily, taking as prescribed  Blood sugars at home are controlled  Denies symptoms of high or low blood sugars, medication side effects  Patient is interested in weight loss to better control her symptoms, BMI is currently 42  She is interested in talking with her weight management specialists  A/P: Diabetes, controlled, continue current medications, refer to bariatrics, follow-up 3 months     Nasal Congestion, Allergies  chronic problem, bothering patient  With occasional seasonal symptoms with her asthma  Has been using her inhaler albuterol more often than not  A/P: Moderate asthma, uncontrolled, start Pulmicort, continue albuterol, follow-up 6 weeks    Patient's complete Health Risk Assessment and

## 2025-04-01 NOTE — PATIENT INSTRUCTIONS
healthcare professional. [facility], [facility], disclaims any warranty or liability for your use of this information.    Personalized Preventive Plan for Sonia Murphy - 4/1/2025  Medicare offers a range of preventive health benefits. Some of the tests and screenings are paid in full while other may be subject to a deductible, co-insurance, and/or copay.  Some of these benefits include a comprehensive review of your medical history including lifestyle, illnesses that may run in your family, and various assessments and screenings as appropriate.  After reviewing your medical record and screening and assessments performed today your provider may have ordered immunizations, labs, imaging, and/or referrals for you.  A list of these orders (if applicable) as well as your Preventive Care list are included within your After Visit Summary for your review.

## 2025-04-03 ENCOUNTER — TREATMENT (OUTPATIENT)
Dept: OCCUPATIONAL THERAPY | Age: 60
End: 2025-04-03
Payer: MEDICARE

## 2025-04-03 DIAGNOSIS — I89.0 LYMPHEDEMA: Primary | ICD-10-CM

## 2025-04-03 PROCEDURE — 97140 MANUAL THERAPY 1/> REGIONS: CPT | Performed by: OCCUPATIONAL THERAPIST

## 2025-04-03 NOTE — PROGRESS NOTES
30.5  30  29.75  31.5       24cm 35.75  36  37  36     36  34.25  34.75  36       28cm 41.5  39.75  42  42     40  40  40  42.5       32cm 44.5  42.75  44  45     44.5  42.75  43  44.25       36cm 45.75  43.5  45  45.25     46  43.5  45  44.5       40cm 41.25  42.5  44  43.75     45  42  43.5  42.5       44cm 40  40  40  41     41.5  39.75  40  40.5       48cm 43  41.75  42  42.75     43.5  41.5  41.5  42       52cm 45.5  45  45  45.5     47  46.5  45  45.5       56cm    49  48  48.5        47  47  48       60cm    52  51  53.5        50.5  50  53       64cm    55  55  55        55.5  57.25  57       68cm    56  59  58          59  60       72cm                           76cm                           80cm                           84cm                              Measurements are made at center of each toe and are circumferential    Eval  Left   12/30/2024  pre pneumatic compression          Follow up #1  1/30/25pre pneumatic compression Follow up #2  3/6/25  pre pneumatic compression Follow up #3  3/19/25pre pneumatic compression Follow up #4 Follow up #5 Eval   Right  12/30/2024  pre pneumatic compression           Follow up #1  1/30/25pre pneumatic compression Follow up #2  3/6/25  pre pneumatic compression Follow up #3  3/19/25pre pneumatic compression Follow up #4 Follow up #5   First toe 8.75  8  8  8     9  8  8.5  8.5       Second toe 5.75  5.5  5.75  5.75     6  5.25  6  6       Third toe 5  5  5  5     5.25  5  5  5.5       Fourth toe 5  4.75  5  5     5  4.75  5  5       Fifth toe 5.5  5  5.25  5     5.25  5  5  5          Functional Outcome Measurements:  -Lymphedema Life Impact Scale Score:  46  Percent Impairment:  64%     -Functional Assessment of Chronic Illness Therapy Fatigue Scale Version 4: 22/52; severe fatigue    Assessment:  Knowledge of home program:   [] Good [x] Fair+  [] Poor  Patient is programming at home:             [x] Yes  [] No  Family is assisting with programming: [] Yes  [x]

## 2025-04-17 ENCOUNTER — TREATMENT (OUTPATIENT)
Dept: OCCUPATIONAL THERAPY | Age: 60
End: 2025-04-17
Payer: MEDICARE

## 2025-04-17 DIAGNOSIS — I89.0 LYMPHEDEMA: Primary | ICD-10-CM

## 2025-04-17 PROCEDURE — 97140 MANUAL THERAPY 1/> REGIONS: CPT | Performed by: OCCUPATIONAL THERAPIST

## 2025-04-17 NOTE — PROGRESS NOTES
OCCUPATIONAL THERAPY LYMPHEDEMA TREATMENT NOTE    [facility] PHYSICIANS [facility] OCCUPATIONAL THERAPY[place] [address]  Phone: [phone]; Fax: [phone]       Date:  3/20/25    Initial Evaluation Date: 2024                               Evaluating Therapist: Kalee Durant, JULIA/BRADEN, COTY     Patient Name:  Sonia Murphy                     :  1965     Restrictions/Precautions:  BMI 40.71, fall risk  Diagnosis:  Lymphedema (I89.0)                                                        Date of Surgery/Injury: none recently     Insurance/Certification information:    Pay[place] [[id number]]  Plan[place] [[id number]]  ID: [id number]  Plan of care signed (Y/N): yes 25-sent 24 and 25; RECERT updated in chart 3/27/25  Visit# / total visits: evaluation + 16  Cancels/No-shows to date: 1    Current update duration from 3/20/25 to 2025    Last seen by therapist:  TODAY     Patient reaction to treatment:  Despite regular and compliant use of conservative treatments for 4 months, including exercise, MLD, elevation and compression (SHORT STRETCH/compression garments), she continues to present with lymphedema. Patient has a good understanding of skin care management and the affects of sodium/diet on their lymphedema. Pt has physical limitations due to PMH. A good candidate for device at this time. Measurements listed below with increased swelling when indep with home program. Attempting to appeal insurance denial for device due to medical need.     Garment / DME recommended:  Medi Plus: calf high; size 6; standard length; 20-30mmHg; open toe-wearing daily-indep to don.   Referring Practitioner/NPI#:    Marielos Jefferson DO NPI: [id number]      Specific Practitioner Orders: Evaluation and treatment; Lymphedema of bilateral lower extremities      Assessment of current deficits   []Pain  []Skin Integrity

## 2025-04-27 ENCOUNTER — HOSPITAL ENCOUNTER (EMERGENCY)
Age: 60
Discharge: HOME | End: 2025-04-27
Payer: MEDICARE

## 2025-04-27 VITALS
RESPIRATION RATE: 18 BRPM | OXYGEN SATURATION: 98 % | SYSTOLIC BLOOD PRESSURE: 142 MMHG | HEART RATE: 79 BPM | BODY MASS INDEX: 38.41 KG/M2 | TEMPERATURE: 97.2 F | WEIGHT: 210 LBS | DIASTOLIC BLOOD PRESSURE: 77 MMHG

## 2025-04-27 DIAGNOSIS — J01.90 ACUTE SINUSITIS, RECURRENCE NOT SPECIFIED, UNSPECIFIED LOCATION: Primary | ICD-10-CM

## 2025-04-27 DIAGNOSIS — R05.9 COUGH, UNSPECIFIED TYPE: ICD-10-CM

## 2025-04-27 PROCEDURE — 99211 OFF/OP EST MAY X REQ PHY/QHP: CPT

## 2025-04-27 RX ORDER — BENZONATATE 200 MG/1
200 CAPSULE ORAL 3 TIMES DAILY PRN
Qty: 15 CAPSULE | Refills: 0 | Status: SHIPPED | OUTPATIENT
Start: 2025-04-27

## 2025-04-27 RX ORDER — AMOXICILLIN AND CLAVULANATE POTASSIUM 500; 125 MG/1; MG/1
1 TABLET, FILM COATED ORAL 3 TIMES DAILY
Qty: 21 TABLET | Refills: 0 | Status: SHIPPED | OUTPATIENT
Start: 2025-04-27 | End: 2025-05-04

## 2025-04-27 RX ORDER — PREDNISONE 10 MG/1
TABLET ORAL
Qty: 12 TABLET | Refills: 0 | Status: SHIPPED | OUTPATIENT
Start: 2025-04-27

## 2025-04-27 ASSESSMENT — PAIN SCALES - GENERAL: PAINLEVEL_OUTOF10: 5

## 2025-04-27 ASSESSMENT — PAIN DESCRIPTION - DESCRIPTORS: DESCRIPTORS: ACHING;DISCOMFORT;DULL

## 2025-04-27 ASSESSMENT — PAIN DESCRIPTION - FREQUENCY: FREQUENCY: CONTINUOUS

## 2025-04-27 ASSESSMENT — PAIN - FUNCTIONAL ASSESSMENT: PAIN_FUNCTIONAL_ASSESSMENT: 0-10

## 2025-04-27 ASSESSMENT — PAIN DESCRIPTION - PAIN TYPE: TYPE: ACUTE PAIN

## 2025-04-27 ASSESSMENT — PAIN DESCRIPTION - LOCATION: LOCATION: FACE

## 2025-04-27 NOTE — ED PROVIDER NOTES
sinusitis, recurrence not specified, unspecified location    2. Cough, unspecified type        Disposition: Discharge to home  Patient condition is good    I am the Primary Clinician of Record.     Hector Grigsby PA-C (electronically signed) on 4/27/2025 at 4:06 PM         Hector Grigsby PA-C  04/27/25 0924

## 2025-04-28 RX ORDER — VALSARTAN AND HYDROCHLOROTHIAZIDE 160; 25 MG/1; MG/1
1 TABLET ORAL DAILY
Qty: 90 TABLET | Refills: 1 | Status: SHIPPED | OUTPATIENT
Start: 2025-04-28

## 2025-04-28 RX ORDER — AMLODIPINE BESYLATE 10 MG/1
10 TABLET ORAL DAILY
Qty: 90 TABLET | Refills: 1 | Status: SHIPPED | OUTPATIENT
Start: 2025-04-28

## 2025-04-28 NOTE — TELEPHONE ENCOUNTER
Name of Medication(s) Requested:  Requested Prescriptions     Pending Prescriptions Disp Refills    valsartan-hydroCHLOROthiazide (DIOVAN-HCT) 160-25 MG per tablet [Pharmacy Med Name: Valsartan-hydroCHLOROthiazide 160-25 MG Oral Tablet] 90 tablet 0     Sig: Take 1 tablet by mouth once daily    amLODIPine (NORVASC) 10 MG tablet [Pharmacy Med Name: amLODIPine Besylate 10 MG Oral Tablet] 90 tablet 0     Sig: Take 1 tablet by mouth once daily       Medication is on current medication list Yes    Dosage and directions were verified? Yes    Quantity verified: 90 day supply     Pharmacy Verified?  Yes    Last Appointment:  4/1/2025    Future appts:  Future Appointments   Date Time Provider Department Center   5/1/2025 12:00 PM Kalee Durant OT [place] [facility]   5/30/2025 10:00 AM Marielos Jefferson, DO [facility] ECC DEP        (If no appt send self scheduling link. .REFILLAPPT)  Scheduling request sent?     [] Yes  [x] No    Does patient need updated?  [] Yes  [] No

## 2025-05-01 ENCOUNTER — TREATMENT (OUTPATIENT)
Dept: OCCUPATIONAL THERAPY | Age: 60
End: 2025-05-01
Payer: MEDICARE

## 2025-05-01 DIAGNOSIS — I89.0 LYMPHEDEMA: Primary | ICD-10-CM

## 2025-05-01 PROCEDURE — 97140 MANUAL THERAPY 1/> REGIONS: CPT | Performed by: OCCUPATIONAL THERAPIST

## 2025-05-01 NOTE — PROGRESS NOTES
Attended     83168 Iontophoresis     94119 Ultrasound      Other     Total  55 4       Plan: Continue to address:  []Bandaging  [] Self Bandaging/Family Assist  [x]MLD  [x]Self Massage  []Exercise  [x]Education  [x]Obtain referral for garments  [x]Hold  []Discharge to Home Program  Kalee Durant OTR/L CLT

## 2025-05-05 ENCOUNTER — TELEPHONE (OUTPATIENT)
Dept: PRIMARY CARE CLINIC | Age: 60
End: 2025-05-05

## 2025-05-05 RX ORDER — FLUCONAZOLE 150 MG/1
150 TABLET ORAL ONCE
Qty: 1 TABLET | Refills: 0 | Status: SHIPPED | OUTPATIENT
Start: 2025-05-05 | End: 2025-05-05

## 2025-05-05 NOTE — TELEPHONE ENCOUNTER
Name of Medication(s) Requested:  Requested Prescriptions     Pending Prescriptions Disp Refills    Cetirizine HCl 10 MG CAPS 30 capsule 2     Sig: Take 1 tablet by mouth as needed (as needed)       Medication is on current medication list Yes    Dosage and directions were verified? Yes    Quantity verified: 30 day supply     Pharmacy Verified?  Yes    Last Appointment:  4/1/2025    Future appts:  Future Appointments   Date Time Provider Department Center   5/21/2025 10:00 AM Agustín Hayes MD [facility]   5/30/2025 10:00 AM Marielos Jefferson DO [facility] ECC DEP   6/12/2025 12:00 PM Kalee Durant OT [place] [facility]   7/24/2025 12:00 PM Kalee Durant OT [place] [facility]        (If no appt send self scheduling link. .REFILLAPPT)  Scheduling request sent?     [] Yes  [x] No    Does patient need updated?  [] Yes  [x] No

## 2025-05-05 NOTE — TELEPHONE ENCOUNTER
Pt went to the urgent care last week and was put on an antibiotic and she now has a yeast infection.  She wants to know if you can call in something for it?      Please advise

## 2025-05-08 NOTE — TELEPHONE ENCOUNTER
Called patient and l/m on a/m that Dr. Jefferson sent in refill for cetirizine and ordered Diflucan and to call if she had any questions.

## 2025-05-21 ENCOUNTER — OFFICE VISIT (OUTPATIENT)
Age: 60
End: 2025-05-21
Payer: MEDICARE

## 2025-05-21 VITALS
HEART RATE: 80 BPM | BODY MASS INDEX: 43.87 KG/M2 | WEIGHT: 238.4 LBS | TEMPERATURE: 97.3 F | HEIGHT: 62 IN | SYSTOLIC BLOOD PRESSURE: 155 MMHG | DIASTOLIC BLOOD PRESSURE: 70 MMHG

## 2025-05-21 DIAGNOSIS — I10 ESSENTIAL HYPERTENSION: ICD-10-CM

## 2025-05-21 DIAGNOSIS — E11.69 TYPE 2 DIABETES MELLITUS WITH OBESITY (HCC): Primary | ICD-10-CM

## 2025-05-21 DIAGNOSIS — E66.813 CLASS 3 SEVERE OBESITY DUE TO EXCESS CALORIES WITH SERIOUS COMORBIDITY AND BODY MASS INDEX (BMI) OF 40.0 TO 44.9 IN ADULT (HCC): ICD-10-CM

## 2025-05-21 DIAGNOSIS — E66.9 TYPE 2 DIABETES MELLITUS WITH OBESITY (HCC): Primary | ICD-10-CM

## 2025-05-21 PROCEDURE — 3078F DIAST BP <80 MM HG: CPT | Performed by: INTERNAL MEDICINE

## 2025-05-21 PROCEDURE — 99205 OFFICE O/P NEW HI 60 MIN: CPT | Performed by: INTERNAL MEDICINE

## 2025-05-21 PROCEDURE — 3077F SYST BP >= 140 MM HG: CPT | Performed by: INTERNAL MEDICINE

## 2025-05-21 RX ORDER — TIRZEPATIDE 2.5 MG/.5ML
2.5 INJECTION, SOLUTION SUBCUTANEOUS WEEKLY
Qty: 2 ML | Refills: 0 | Status: SHIPPED | OUTPATIENT
Start: 2025-05-21 | End: 2025-05-21 | Stop reason: SDUPTHER

## 2025-05-21 RX ORDER — TIRZEPATIDE 2.5 MG/.5ML
2.5 INJECTION, SOLUTION SUBCUTANEOUS WEEKLY
Qty: 2 ML | Refills: 0 | Status: SHIPPED | OUTPATIENT
Start: 2025-05-21

## 2025-05-21 NOTE — PROGRESS NOTES
CC -   HTN, Obesity    BACKGROUND -   First visit: 5/21/25    Obesity (all weight in lbs)  Initial Ht 62.25\", Wt 238.4,  BMI 43.25  Began slowly over 9 years  HS Grad wt 110   Lowest   wt 110   Highest  wt 238.4  Pattern of wt gain: gradual  Wt change past yr: ~+20 lbs  Most wt lost: 130 lbs (started to walk 5 miles and started a job)  Other diets attempted: has not tried any diet.    Desire to lose weight: 10/10 (Patient's own goal weight: 130 lbs; does not want to look at bariatric surgery currently)    Initial Diet:    Number of meals per day - 2-3 (s-b)    Number of snacks per day - 1-2    Meal volume - 12\" plate,  ~1x/wk seconds    Fast food/convenience store - 1x/week    Restaurants (not fast food) - 0x/week (1x/m)   Sweets - 1d/week (dark chocolate, smt ice cream)   Chips - 0d/week   Crackers/pretzels - 0d/week   Nuts - 1-2d/week (almonds)   Peanut Butter - 0d/week   Popcorn - 2d/week   Dried fruit - 0d/week   Whole fruit - 7d/week (1 serving)   Breakfast cereal - 1d/week (cheerios with 2% milk)   Granola/Protein/Energy bar - 0d/week   Sugar sweetened beverages - no pop/soda, grape juice 6-8 oz ~1x/2wk smt orange juice, no coffee, 3x/wk hot tea with splenda, 1x/2wks energy drinks - vitamin B shots (?150 mg caffeine)   Protein - No supplements   Fiber - No supplements    Wt effect of HR foods = 700 Dann/wk = 100 Dann/d= 5.6% DEN = 10 lb/year.     Initial Exercise:    Gym membership - PF - was off d/t lymphedema exercise, but now can restart    Walking - walks up and down her street ~6k steps/day - tries to do every day    Running - no    Resistance - no    Aerobic class - no     Sleep: 'Terrible'. 5-6 hrs/night. Occ feels rested, feels tired other times. Daytime nap almost everyday lately. STOP BANG at least 5/8 - does not want sleep medicine consultation currently with few things going on.  ______________________    [facility] -  Past Medical History:   Diagnosis Date    Asthma     Foot fracture, right

## 2025-05-21 NOTE — PATIENT INSTRUCTIONS
Rules:  Limit sweets to one day per month  Limit chips/crackers/pretzels/nuts/popcorn to 150 dann/day  Eliminate all sugar sweetened beverages (including fruit juice)  Limit restaurants (including fast food and food from a convenience store) to one time every two weeks while in town    Requirements:  Make sure protein intake is at least 75 grams per day (do not count protein every day; instead spot check your intake every 2-3 weeks and make sure what you think you are getting is close to accurate; consider using a protein shake if needed; these are in the pharmacy section of the stores, not the grocery section; Premier, Pure Protein and Fairlife are relatively inexpensive and taste good to most patients; other options are Nectar, Boost Max, Ensure Max, BeneProtein and GNC lean (which is lactose-free);   Nectar fruit, Premier Protein Clear, IsoPure Protein Drink, and Protein 2 O are water-based options; Quest (or Cosco, which is cheaper and is ordered on Amazon) and the PostBeyond 1 protein bars can also be used, but have less protein in them ) (<200 Dann, >25 g protein) - (chicken, fish, turkey, egg white)  (Disclaimer: Dietary supplements rarely have their listed ingredients and the amount of each verified by a third party other. Sometimes they give verification for their claims to be GMO and gluten free and to be organic. However, even such verifications as these may still be untrustworthy.)  Make sure that fiber intake is at least 22 grams per day. Do this in part or whole by taking 12 tablespoons of Fiber one original cereal, or 4 tablespoons of wheat dextrin powder (Benefiber or generic brand); for both of these, start with 1/8th - 1/4th the target amount and every week add another 1/8th - 1/4th until reaching the target).  Also, fiber gummies containing inulin (such as Nature Mad, Torres, Benefiber) or Fiber Choice Pre-biotic tablets containing inulin are also an option.1 cup of beans or peas and Ole Mexican

## 2025-05-29 ENCOUNTER — OFFICE VISIT (OUTPATIENT)
Dept: PRIMARY CARE CLINIC | Age: 60
End: 2025-05-29
Payer: MEDICARE

## 2025-05-29 VITALS
HEART RATE: 73 BPM | TEMPERATURE: 96.9 F | DIASTOLIC BLOOD PRESSURE: 79 MMHG | OXYGEN SATURATION: 94 % | WEIGHT: 234 LBS | HEIGHT: 62 IN | SYSTOLIC BLOOD PRESSURE: 136 MMHG | BODY MASS INDEX: 43.06 KG/M2

## 2025-05-29 DIAGNOSIS — Z12.31 ENCOUNTER FOR SCREENING MAMMOGRAM FOR MALIGNANT NEOPLASM OF BREAST: ICD-10-CM

## 2025-05-29 DIAGNOSIS — E11.9 TYPE 2 DIABETES MELLITUS WITHOUT COMPLICATION, WITHOUT LONG-TERM CURRENT USE OF INSULIN (HCC): ICD-10-CM

## 2025-05-29 DIAGNOSIS — J45.40 MODERATE PERSISTENT ASTHMA WITHOUT COMPLICATION: Primary | ICD-10-CM

## 2025-05-29 LAB — HBA1C MFR BLD: 8 %

## 2025-05-29 PROCEDURE — 3052F HG A1C>EQUAL 8.0%<EQUAL 9.0%: CPT | Performed by: FAMILY MEDICINE

## 2025-05-29 PROCEDURE — 83036 HEMOGLOBIN GLYCOSYLATED A1C: CPT | Performed by: FAMILY MEDICINE

## 2025-05-29 PROCEDURE — 99214 OFFICE O/P EST MOD 30 MIN: CPT | Performed by: FAMILY MEDICINE

## 2025-05-29 RX ORDER — BUDESONIDE 0.5 MG/2ML
500 INHALANT ORAL 2 TIMES DAILY
Qty: 60 EACH | Refills: 2 | Status: SHIPPED | OUTPATIENT
Start: 2025-05-29

## 2025-05-29 RX ORDER — MONTELUKAST SODIUM 10 MG/1
10 TABLET ORAL DAILY
Qty: 30 TABLET | Refills: 2 | Status: SHIPPED | OUTPATIENT
Start: 2025-05-29

## 2025-05-29 NOTE — PROGRESS NOTES
[facility]  Family Medicine      Patient:  Sonia Murphy 60 y.o. female  Date of Service: 5/29/25      Chief complaint:   Chief Complaint   Patient presents with    Follow-up     6 week follow up - Breathing         History of Present Illness   Sonia Murphy is a 60 y.o. female who presents to the clinic with complaints as above.    Moderate Persistent Asthma  uncontrolled  Patient states breathing is not doing very well lately, still with a lot of shortness of breath  Current medications include Pulmicort 0.25 mg twice daily, albuterol  Still using her albuterol several times per week  Allergy symptoms Present    Diabetes Mellitus Type 2  Hemoglobin A1C   Date Value Ref Range Status   05/29/2025 8.0 % Final   Last A1c on 12/16/2024 was 8.1  Medications include glimepiride 2 mg daily, Mounjaro  Mounjaro as recently prescribed by her weight management physician, who advised her to stop taking the Amaryl when she gets the Mounjaro, she plans to start the Mounjaro tomorrow.  Agree with stopping glimepiride at that time  Blood sugars at home are controlled  Denies symptoms of high or low blood sugars, medication side effects      Current Health Maintenance:  Health Maintenance   Topic Date Due    Diabetic foot exam  Never done    HIV screen  Never done    Diabetic retinal exam  Never done    Cervical cancer screen  Never done    Shingles vaccine (1 of 2) Never done    Pneumococcal 50+ years Vaccine (2 of 2 - PCV) 10/31/2017    DTaP/Tdap/Td vaccine (2 - Td or Tdap) 05/27/2019    Respiratory Syncytial Virus (RSV) Pregnant or age 60 yrs+ (1 - Risk 60-74 years 1-dose series) Never done    COVID-19 Vaccine (1 - 2024-25 season) Never done    Lipids  04/29/2025    Breast cancer screen  04/30/2025    Diabetic Alb to Cr ratio (uACR) test  09/16/2025    GFR test (Diabetes, CKD 3-4, OR last GFR 15-59)  12/03/2025    Depression Screen  04/01/2026    A1C test (Diabetic or Prediabetic)  05/29/2026    Colorectal

## 2025-06-12 ENCOUNTER — TELEPHONE (OUTPATIENT)
Dept: OCCUPATIONAL THERAPY | Age: 60
End: 2025-06-12

## 2025-06-12 NOTE — TELEPHONE ENCOUNTER
[facility] OCCUPATIONAL THERAPY   [address]  [place] [address]  Dept: [phone]  Loc: [phone]   [place] Fax: [phone]    Cancellation/No Show Note      Date:  2025    Patient Name:  Sonia Murphy     :  1965         PT ID: [id number]    Total missed visits including today: 2       Total number of no shows: 1    For today's appointment patient:   []  Cancelled   []  Rescheduled appointment   [x]  No-show     Reason given by patient:   []  Patient ill   []  Conflicting appointment   []  No transportation   []  Conflict with work   [x]  No reason given   []  Other:    Comments:                    Comments:      Electronically signed by:  Kalee Durant OT,

## 2025-07-01 ENCOUNTER — TREATMENT (OUTPATIENT)
Dept: OCCUPATIONAL THERAPY | Age: 60
End: 2025-07-01
Payer: MEDICARE

## 2025-07-01 DIAGNOSIS — I89.0 LYMPHEDEMA: Primary | ICD-10-CM

## 2025-07-01 PROCEDURE — 97140 MANUAL THERAPY 1/> REGIONS: CPT | Performed by: OCCUPATIONAL THERAPIST

## 2025-07-01 NOTE — PROGRESS NOTES
decreased ability to reach back calf and feet. MIN swelling overall to bilateral LE, including swelling at the ankles due to her inability to reach this areas independently with MLD.  Less swelling at end of session for TRACE overall. Awaiting pump and with overall consistency, recommending a 1 month follow up. Measurements below. (55min [id number])      Restrictions/Precautions:  [] No blood pressure/blood draw in: [] Left Upper Extremity     [] Right Upper Extremity        [x] Fall Risk       Intervention:   []Other    Pain:  [] No    [x] Yes  Location: feet/legs bilateral-ankles/heal   Pain Rating (0-10 pain scale): 2/10  Pain Description: aching, uncomfortable  Interruption of Treatment [] Yes  [x] No    Came to Clinic:  [] Bandaged    []Unbandaged    [] With Stocking     [] With Sleeve     [] Kinesiotaped    [] Unna Boot    [x] With Alternative Compression: Medi Plus: calf high; size 6; standard length; 20-30mmHg; open toe.     Skin Integrity:  [] Normal [x] Dry  []Rough []Moist []Rash  Location of problem area/s:  [x]Hyperplasia   []Hyperkeratosis  [x]Hyperpigmentation  []Papillomas  []Lymphorrhea  []Lipodermatosclerosis   [] Other    Color:  [] Normal [] Mottled [x] Flushed [] Other/Description  Location of problem area/s:    Edema:  Edema Location: Right LE from knee down  [] 1+ Edema [x] 2+ Edema  [] 3+ Edema [] 4+ Edema  Edema Location: Left LE from knee down  [] 1+ Edema [x] 2+ Edema  [] 3+ Edema [] 4+ Edema  Edema Location: bilateral feet and thighs  [x] 1+ Edema [] 2+ Edema  [] 3+ Edema [] 4+ Edema    Subjective: Pt reported a noted difference following last session. Continues to await pump. Very upset with denial.     Objective:  [x] Measurement [x]Manual Lymph Drainage  [x]Bandaging   []Kinesiotaping [x] Education    [x]Self Massage   [x]Self Bandaging [x] Exercise    [x]Wound Care  [x]Hygiene  [] Other    Lymphedema Measurements:  Lymphedema Lower Extremity Measurements     Measurements are made in 4cm

## 2025-07-16 ENCOUNTER — OFFICE VISIT (OUTPATIENT)
Age: 60
End: 2025-07-16
Payer: MEDICARE

## 2025-07-16 VITALS
BODY MASS INDEX: 41.77 KG/M2 | DIASTOLIC BLOOD PRESSURE: 65 MMHG | HEART RATE: 77 BPM | SYSTOLIC BLOOD PRESSURE: 147 MMHG | TEMPERATURE: 97.3 F | WEIGHT: 227 LBS | HEIGHT: 62 IN

## 2025-07-16 DIAGNOSIS — I10 ESSENTIAL HYPERTENSION: Primary | ICD-10-CM

## 2025-07-16 DIAGNOSIS — E66.813 CLASS 3 SEVERE OBESITY DUE TO EXCESS CALORIES WITH SERIOUS COMORBIDITY AND BODY MASS INDEX (BMI) OF 40.0 TO 44.9 IN ADULT (HCC): ICD-10-CM

## 2025-07-16 PROCEDURE — 3078F DIAST BP <80 MM HG: CPT | Performed by: INTERNAL MEDICINE

## 2025-07-16 PROCEDURE — 3077F SYST BP >= 140 MM HG: CPT | Performed by: INTERNAL MEDICINE

## 2025-07-16 PROCEDURE — 99214 OFFICE O/P EST MOD 30 MIN: CPT | Performed by: INTERNAL MEDICINE

## 2025-07-16 NOTE — PATIENT INSTRUCTIONS
Rules:  Limit sweets to one day per month  Limit chips/crackers/pretzels/nuts/popcorn to 150 dann/day  Eliminate all sugar sweetened beverages (including fruit juice)  Limit restaurants (including fast food and food from a convenience store) to one time every two weeks while in town    Requirements:  Make sure protein intake is at least 75 grams per day (do not count protein every day; instead spot check your intake every 2-3 weeks and make sure what you think you are getting is close to accurate; consider using a protein shake if needed; these are in the pharmacy section of the stores, not the grocery section; Premier, Pure Protein and Fairlife are relatively inexpensive and taste good to most patients; other options are Nectar, Boost Max, Ensure Max, BeneProtein and GNC lean (which is lactose-free);   Nectar fruit, Premier Protein Clear, IsoPure Protein Drink, and Protein 2 O are water-based options; Quest (or Cosco, which is cheaper and is ordered on Amazon) and the [employer] 1 protein bars can also be used, but have less protein in them ) (<200 Dann, >25 g protein) - (chicken, fish, turkey, egg white)  (Disclaimer: Dietary supplements rarely have their listed ingredients and the amount of each verified by a third party other. Sometimes they give verification for their claims to be GMO and gluten free and to be organic. However, even such verifications as these may still be untrustworthy.)  Make sure that fiber intake is at least 22 grams per day. Do this in part or whole by taking 12 tablespoons of Fiber one original cereal, or 4 tablespoons of wheat dextrin powder (Benefiber or generic brand); for both of these, start with 1/8th - 1/4th the target amount and every week add another 1/8th - 1/4th until reaching the target).  Also, fiber gummies containing inulin (such as Nature Mad, Torres, Benefiber) or Fiber Choice Pre-biotic tablets containing inulin are also an option.1 cup of beans or peas and Ole Mexican

## 2025-07-16 NOTE — PROGRESS NOTES
CC -   HTN, Obesity    BACKGROUND -   Last visit: 5/21/2025   First visit: 5/21/25    Obesity (all weight in lbs)  Initial Ht 62.25\", Wt 238.4,  BMI 43.25  Began slowly over 9 years  HS Grad wt 110   Lowest   wt 110   Highest  wt 238.4  Pattern of wt gain: gradual  Wt change past yr: ~+20 lbs  Most wt lost: 130 lbs (started to walk 5 miles and started a job)  Other diets attempted: has not tried any diet.    Desire to lose weight: 10/10 (Patient's own goal weight: 130 lbs; does not want to look at bariatric surgery currently)    Initial Diet:    Number of meals per day - 2-3 (s-b)    Number of snacks per day - 1-2    Meal volume - 12\" plate,  ~1x/wk seconds    Fast food/convenience store - 1x/week    Restaurants (not fast food) - 0x/week (1x/m)   Sweets - 1d/week (dark chocolate, smt ice cream)   Chips - 0d/week   Crackers/pretzels - 0d/week   Nuts - 1-2d/week (almonds)   Peanut Butter - 0d/week   Popcorn - 2d/week   Dried fruit - 0d/week   Whole fruit - 7d/week (1 serving)   Breakfast cereal - 1d/week (cheerios with 2% milk)   Granola/Protein/Energy bar - 0d/week   Sugar sweetened beverages - no pop/soda, grape juice 6-8 oz ~1x/2wk smt orange juice, no coffee, 3x/wk hot tea with splenda, 1x/2wks energy drinks - vitamin B shots (?150 mg caffeine)   Protein - No supplements   Fiber - No supplements    Wt effect of HR foods = 700 Dann/wk = 100 Dann/d= 5.6% DEN = 10 lb/year.     Initial Exercise:    Gym membership - PF - was off d/t lymphedema exercise, but now can restart    Walking - walks up and down her street ~6k steps/day - tries to do every day    Running - no    Resistance - no    Aerobic class - no     Sleep: 'Terrible'. 5-6 hrs/night. Occ feels rested, feels tired other times. Daytime nap almost everyday lately. STOP BANG at least 5/8 - does not want sleep medicine consultation currently with few things going on.  ______________________    [facility] -  Past Medical History:   Diagnosis Date    Asthma     Foot

## 2025-07-24 ENCOUNTER — TREATMENT (OUTPATIENT)
Dept: OCCUPATIONAL THERAPY | Age: 60
End: 2025-07-24
Payer: MEDICARE

## 2025-07-24 DIAGNOSIS — I89.0 LYMPHEDEMA: Primary | ICD-10-CM

## 2025-07-24 PROCEDURE — 97140 MANUAL THERAPY 1/> REGIONS: CPT | Performed by: OCCUPATIONAL THERAPIST

## 2025-07-24 NOTE — PROGRESS NOTES
and client centered tasks.  25: Pt completed cervical and shoulder ROM exercises 10x each plan with focus on body mechanics to promote lymphatic flow and function of the cervical and axillary lymph nodes. This therapist also recommended aquatic therapy to benefit lymphatic function for low impact exercise. Pt educated on and provided with hand out for LB lymphedema exercises to promote muscle pump, lymphatic drainage and stimulation of lymphatic function. Therapist demonstrated different techniques and possible modifications with ankle pumps, quad sets, glut sets, heel slide, hip abduction/adduction, and straight leg raises . Pt with fair+ understanding. Educated pt that if time allowed for only MLD or exercises, to complete MLD first. Goal met to highest indep. (0 min [id number])      LT weeks  1-Patient will be independent with self-management of lymphedema including understanding garment wear schedule, self-compression bandaging, HEP and self-care. -Pt attempting on her own without success. Recommending pump for long term management. Indep with HEP and compression and is wearing consistently for 4+MONTHS-Goal met to highest indep within capabilities.   2-Patient will be fitted for appropriate compression garments for long term management of lymphedema. -Medi Plus: calf high; size 6; standard length; 20-30mmHg; open toe. Indep to don. Wearing daily. Goal met.   3- Patient will present with decreased limb volume in the involved extremity from min to trace  for improved indep in client centered tasks.  2025: Reviewed/completed MLD sequence to the bilateral LE including cervical/shoulder ROM and abdominal sequence to improve tissue extensibility; increase range of motion; induce relaxation; mobilize or manipulate soft tissue and joints; modulate pain; and reduce edema for carry over into self care and client centered tasks. Pt with good understanding and recommending that they continue to complete daily. Has

## 2025-07-28 DIAGNOSIS — R06.02 SHORTNESS OF BREATH: ICD-10-CM

## 2025-07-28 RX ORDER — FUROSEMIDE 20 MG/1
20 TABLET ORAL DAILY
Qty: 90 TABLET | Refills: 1 | Status: SHIPPED | OUTPATIENT
Start: 2025-07-28

## 2025-07-28 NOTE — TELEPHONE ENCOUNTER
Name of Medication(s) Requested:  Requested Prescriptions     Pending Prescriptions Disp Refills    furosemide (LASIX) 20 MG tablet [Pharmacy Med Name: Furosemide 20 MG Oral Tablet] 90 tablet 0     Sig: Take 1 tablet by mouth once daily       Medication is on current medication list Yes    Dosage and directions were verified? Yes    Quantity verified: 90 day supply     Pharmacy Verified?  Yes    Last Appointment:  5/29/2025    Future appts:  Future Appointments   Date Time Provider Department Center   8/28/2025 10:00 AM Kalee Durant OT [facility]   8/29/2025 11:00 AM Marielos Jefferson DO [facility] DEP   10/16/2025 10:20 AM Agustín Hayes MD S[facility]        (If no appt send self scheduling link. .REFILLAPPT)  Scheduling request sent?     [] Yes  [x] No    Does patient need updated?  [] Yes  [x] No

## 2025-08-19 DIAGNOSIS — J45.40 MODERATE PERSISTENT ASTHMA WITHOUT COMPLICATION: ICD-10-CM

## 2025-08-25 DIAGNOSIS — J45.40 MODERATE PERSISTENT ASTHMA WITHOUT COMPLICATION: ICD-10-CM

## 2025-08-25 RX ORDER — BUDESONIDE 0.5 MG/2ML
INHALANT ORAL
Qty: 120 ML | Refills: 5 | Status: SHIPPED | OUTPATIENT
Start: 2025-08-25

## 2025-08-25 RX ORDER — MONTELUKAST SODIUM 10 MG/1
10 TABLET ORAL DAILY
Qty: 30 TABLET | Refills: 5 | Status: SHIPPED | OUTPATIENT
Start: 2025-08-25

## 2025-08-28 ENCOUNTER — TELEPHONE (OUTPATIENT)
Dept: OCCUPATIONAL THERAPY | Age: 60
End: 2025-08-28

## 2025-09-02 ENCOUNTER — TREATMENT (OUTPATIENT)
Dept: OCCUPATIONAL THERAPY | Age: 60
End: 2025-09-02
Payer: MEDICARE

## 2025-09-02 DIAGNOSIS — I89.0 LYMPHEDEMA: Primary | ICD-10-CM

## 2025-09-02 PROCEDURE — 97140 MANUAL THERAPY 1/> REGIONS: CPT | Performed by: OCCUPATIONAL THERAPIST

## 2025-09-02 PROCEDURE — 97535 SELF CARE MNGMENT TRAINING: CPT | Performed by: OCCUPATIONAL THERAPIST
